# Patient Record
Sex: FEMALE | Race: WHITE | Employment: OTHER | URBAN - METROPOLITAN AREA
[De-identification: names, ages, dates, MRNs, and addresses within clinical notes are randomized per-mention and may not be internally consistent; named-entity substitution may affect disease eponyms.]

---

## 2020-11-16 ENCOUNTER — HOSPITAL ENCOUNTER (OUTPATIENT)
Dept: SURGERY | Age: 85
Discharge: HOME | End: 2020-11-16
Attending: ORTHOPAEDIC SURGERY
Payer: MEDICARE

## 2020-11-16 ENCOUNTER — HOSPITAL ENCOUNTER (OUTPATIENT)
Dept: PHYSICAL THERAPY | Age: 85
Discharge: HOME | End: 2020-11-16
Attending: ORTHOPAEDIC SURGERY
Payer: MEDICARE

## 2020-11-16 VITALS
OXYGEN SATURATION: 95 % | DIASTOLIC BLOOD PRESSURE: 75 MMHG | TEMPERATURE: 97.7 F | RESPIRATION RATE: 16 BRPM | HEART RATE: 63 BPM | BODY MASS INDEX: 26.96 KG/M2 | SYSTOLIC BLOOD PRESSURE: 149 MMHG | WEIGHT: 182 LBS | HEIGHT: 69 IN

## 2020-11-16 LAB
ANION GAP SERPL CALC-SCNC: 4 MMOL/L (ref 7–16)
APTT PPP: 26.9 SEC (ref 24.1–35.1)
ATRIAL RATE: 59 BPM
BACTERIA SPEC CULT: NORMAL
BASOPHILS # BLD: 0.1 K/UL (ref 0–0.2)
BASOPHILS NFR BLD: 1 % (ref 0–2)
BUN SERPL-MCNC: 29 MG/DL (ref 8–23)
CALCIUM SERPL-MCNC: 9.1 MG/DL (ref 8.3–10.4)
CALCULATED P AXIS, ECG09: 55 DEGREES
CALCULATED R AXIS, ECG10: 39 DEGREES
CALCULATED T AXIS, ECG11: 46 DEGREES
CHLORIDE SERPL-SCNC: 109 MMOL/L (ref 98–107)
CO2 SERPL-SCNC: 30 MMOL/L (ref 21–32)
CREAT SERPL-MCNC: 1.48 MG/DL (ref 0.6–1)
DIAGNOSIS, 93000: NORMAL
DIFFERENTIAL METHOD BLD: ABNORMAL
EOSINOPHIL # BLD: 0.4 K/UL (ref 0–0.8)
EOSINOPHIL NFR BLD: 4 % (ref 0.5–7.8)
ERYTHROCYTE [DISTWIDTH] IN BLOOD BY AUTOMATED COUNT: 12.9 % (ref 11.9–14.6)
EST. AVERAGE GLUCOSE BLD GHB EST-MCNC: 160 MG/DL
GLUCOSE SERPL-MCNC: 87 MG/DL (ref 65–100)
HBA1C MFR BLD: 7.2 %
HCT VFR BLD AUTO: 48.5 % (ref 35.8–46.3)
HGB BLD-MCNC: 15.6 G/DL (ref 11.7–15.4)
IMM GRANULOCYTES # BLD AUTO: 0.1 K/UL (ref 0–0.5)
IMM GRANULOCYTES NFR BLD AUTO: 1 % (ref 0–5)
INR PPP: 1
LYMPHOCYTES # BLD: 2.7 K/UL (ref 0.5–4.6)
LYMPHOCYTES NFR BLD: 26 % (ref 13–44)
MCH RBC QN AUTO: 32.5 PG (ref 26.1–32.9)
MCHC RBC AUTO-ENTMCNC: 32.2 G/DL (ref 31.4–35)
MCV RBC AUTO: 101 FL (ref 79.6–97.8)
MONOCYTES # BLD: 0.7 K/UL (ref 0.1–1.3)
MONOCYTES NFR BLD: 7 % (ref 4–12)
NEUTS SEG # BLD: 6.5 K/UL (ref 1.7–8.2)
NEUTS SEG NFR BLD: 63 % (ref 43–78)
NRBC # BLD: 0 K/UL (ref 0–0.2)
P-R INTERVAL, ECG05: 164 MS
PLATELET # BLD AUTO: 206 K/UL (ref 150–450)
PMV BLD AUTO: 10.9 FL (ref 9.4–12.3)
POTASSIUM SERPL-SCNC: 4.4 MMOL/L (ref 3.5–5.1)
PROTHROMBIN TIME: 13.7 SEC (ref 12.5–14.7)
Q-T INTERVAL, ECG07: 446 MS
QRS DURATION, ECG06: 128 MS
QTC CALCULATION (BEZET), ECG08: 441 MS
RBC # BLD AUTO: 4.8 M/UL (ref 4.05–5.2)
SERVICE CMNT-IMP: NORMAL
SODIUM SERPL-SCNC: 143 MMOL/L (ref 136–145)
VENTRICULAR RATE, ECG03: 59 BPM
WBC # BLD AUTO: 10.5 K/UL (ref 4.3–11.1)

## 2020-11-16 PROCEDURE — 85730 THROMBOPLASTIN TIME PARTIAL: CPT

## 2020-11-16 PROCEDURE — 80048 BASIC METABOLIC PNL TOTAL CA: CPT

## 2020-11-16 PROCEDURE — 85610 PROTHROMBIN TIME: CPT

## 2020-11-16 PROCEDURE — 85025 COMPLETE CBC W/AUTO DIFF WBC: CPT

## 2020-11-16 PROCEDURE — 87641 MR-STAPH DNA AMP PROBE: CPT

## 2020-11-16 PROCEDURE — 83036 HEMOGLOBIN GLYCOSYLATED A1C: CPT

## 2020-11-16 PROCEDURE — 36415 COLL VENOUS BLD VENIPUNCTURE: CPT

## 2020-11-16 PROCEDURE — 97161 PT EVAL LOW COMPLEX 20 MIN: CPT

## 2020-11-16 PROCEDURE — 77030027138 HC INCENT SPIROMETER -A

## 2020-11-16 RX ORDER — GLIPIZIDE 5 MG/1
5 TABLET, FILM COATED, EXTENDED RELEASE ORAL DAILY
COMMUNITY
Start: 2020-04-23

## 2020-11-16 RX ORDER — CHOLECALCIFEROL TAB 125 MCG (5000 UNIT) 125 MCG
1000 TAB ORAL DAILY
COMMUNITY
Start: 2018-12-26

## 2020-11-16 RX ORDER — BENAZEPRIL HYDROCHLORIDE 5 MG/1
5 TABLET ORAL DAILY
COMMUNITY
Start: 2020-01-14 | End: 2021-01-13

## 2020-11-16 RX ORDER — GABAPENTIN 100 MG/1
100 CAPSULE ORAL 3 TIMES DAILY
COMMUNITY
Start: 2020-02-25

## 2020-11-16 RX ORDER — ATORVASTATIN CALCIUM 10 MG/1
10 TABLET, FILM COATED ORAL DAILY
COMMUNITY
Start: 2020-04-19

## 2020-11-16 RX ORDER — CEFAZOLIN SODIUM/WATER 2 G/20 ML
2 SYRINGE (ML) INTRAVENOUS ONCE
Status: CANCELLED | OUTPATIENT
Start: 2020-11-16 | End: 2020-11-16

## 2020-11-16 RX ORDER — HYDROCHLOROTHIAZIDE 12.5 MG/1
12.5 CAPSULE ORAL DAILY
COMMUNITY
Start: 2020-08-03

## 2020-11-16 RX ORDER — UREA 10 %
LOTION (ML) TOPICAL DAILY
COMMUNITY

## 2020-11-16 RX ORDER — SOLIFENACIN SUCCINATE 5 MG/1
5 TABLET, FILM COATED ORAL DAILY
COMMUNITY
Start: 2019-12-23

## 2020-11-16 RX ORDER — ASPIRIN 81 MG/1
81 TABLET ORAL DAILY
COMMUNITY
End: 2020-12-09

## 2020-11-16 NOTE — PROGRESS NOTES
Joint Camp Case Management note:  Patient screened in Prehab for discharge planning needs. Patient scheduled for a future total joint replacement. We discussed Home Health and equipment needed after surgery. List of Home Health providers offered. Patient w/o preference towards provider. Patient lives in [facility]. Will send ref to [facility] at time of surgery. Will need a walker but has a RTS.

## 2020-11-16 NOTE — PERIOP NOTES
Patient verified name and . Order for consent  found in EHR and matches case posting; patient verified. Type 3 surgery, joint camp assessment complete. Labs per surgeon: CBC,BMP, PT/PTT, Hgb A1c ; results within anesthesia guidelines;; routed via fax to pcp, Dr. Janeth Garcia and to surgeon, Dr Jennyfer Barragan for review. Labs per anesthesia protocol: no additional  EKG:completed today per protocol~abnormal~placed on chart along with ECHO from 19 and EKG from 3/22/18. Pt does not see cardiologist.  No other cardiology records available. Chart placed in problem chart box for anesthesia review. Most recent nephrology note 20 Dr Kalina Romeo printed and placed on chart for anesthesia reference. Patient aware that a negative Covid swab result is required to proceed with surgery; appointments are made by the surgeon office and should test should be collected 7 days prior to surgery. The testing center is located at the . [address], [place]. MRSA/MSSA swab collected; pharmacy to review and dose antibiotic as appropriate. Hospital approved surgical skin cleanser and instructions to return bottle on DOS given per hospital policy. Patient provided with handouts including Guide to Surgery, Pain Management, Hand Hygiene, Blood Transfusion Education, and [facility] Brochure. Patient answered medical/surgical history questions at their best of ability. All prior to admission medications documented in [facility]. Original medication prescription bottle YES visualized during patient appointment. Patient instructed to hold all vitamins 3 weeks prior to surgery and NSAIDS 5 days prior to surgery. Patient teach back successful and patient demonstrates knowledge of instruction.

## 2020-11-16 NOTE — PROGRESS NOTES
11/16/20 1200   Oxygen Therapy   O2 Sat (%) 96 %   Pulse via Oximetry 82 beats per minute   O2 Device Room air     Initial respiratory Assessment completed with pt. Pt was interviewed and evaluated in Joint camp prior to surgery. Patient ID:  David Coronado  [id number]  27 y.o.  10/11/1930  Surgeon: Dr. Bonnielee Dubin  Date of Surgery: 12/7/2020  Procedure: Total Right Hip Arthroplasty  Primary Care Physician: Je Crespo -[phone]  Specialists:     Pt. IS SOMEWHAT PRACTICING SOCIAL DISTANCING AND WEARING A MASK WHEN IN PUBLIC. Pt taught proper COUGH technique  DIAPHRAGMATIC BREATHING EXERCISE INSTRUCTIONS GIVEN    History of smoking:   DENIES                 Quit date:         Secondhand smoke:DENIES    Past procedures with Oxygen desaturation or delayed awakening:DENIES    Past Medical History:   Diagnosis Date    Arthralgia     Chronic kidney disease     CKD (chronic kidney disease), stage III     GFR 30-59; followed by Dr Raul Uribe nephrologist; 11/2/20:  BUN 30; Creatinine 1.66    Essential hypertension     Hip pain     Hyperlipidemia     Mild aortic valve regurgitation     and sclerosis; [facility]CHO 2/25/19 in [facility]     ECHO 2/25/19 in [facility] Neuropathy     legs    Overactive bladder     followed by urology    Primary osteoarthritis of both knees     Right bundle branch block     Type 2 diabetes mellitus ([facility] [address]     stable~ Hgb A1C 11/2/20:  6.6; takes Glipizide daily        Respiratory history:DENIES SOB                                                                   Respiratory meds:  DENIES    FAMILY PRESENT:            YES- DAUGHTER                                                   PAST SLEEP STUDY:                           DENIES  HX OF PAVITHRA:                                            DENIES  PAVITHRA assessment:                                               SLEEPS ON SIDE        PHYSICAL EXAM   Body mass index is 26.88 kg/m².    Visit Vitals  BP (!) 149/75 (BP 1 Location: Left arm, BP Patient Position: Sitting)   Pulse 63   Temp 97.7 °F (36.5 °C)   Resp 16   Ht 5' 9\" (1.753 m)   Wt 82.6 kg (182 lb)   SpO2 95%   BMI 26.88 kg/m²     Neck circumference:   40   cm    Loud snoring:                                                            DENIES  Witnessed apnea or wakening gasping or choking:        DENIES        Awakens with headaches:                                               DENIES  Morning or daytime tiredness/ sleepiness:                     TIRED  Pt states age related.   Dry mouth or sore throat in morning:                              DENIES                                   Das stage: 4                                    SACS score:13  Stop Bang 3  STOP-BANG  Does the patient snore loudly (louder than talking or loud enough to be heard through closed doors)?: No  Does the patient often feel tired, fatigued, or sleepy during the daytime, even after a \"good\" night's sleep?: Yes  Has anyone ever observed the patient stop breathing during their sleep? : No  Does the patient have or are they being treated for high blood pressure?: Yes  Is the patient's BMI greater than 35?: No  Is your neck circumference greater than 17 inches (Male) or 16 inches (Female)?: No  Is the patient older than 48?: Yes  Is the patient male?: No  PAVITHRA Score: 3  Has the patient been referred to Sleep Medicine?: No  Has the patient previously been diagnosed with Obstructive Sleep Apnea?: No                               CS                                      Referrals:    Pt. Phone Number:

## 2020-11-16 NOTE — PERIOP NOTES
Recent Results (from the past 8 hour(s))   CBC WITH AUTOMATED DIFF    Collection Time: 11/16/20 12:15 PM   Result Value Ref Range    WBC 10.5 4.3 - 11.1 K/uL    RBC 4.80 4.05 - 5.2 M/uL    HGB 15.6 (H) 11.7 - 15.4 g/dL    HCT 48.5 (H) 35.8 - 46.3 %    .0 (H) 79.6 - 97.8 FL    MCH 32.5 26.1 - 32.9 PG    MCHC 32.2 31.4 - 35.0 g/dL    RDW 12.9 11.9 - 14.6 %    PLATELET 970 872 - 125 K/uL    MPV 10.9 9.4 - 12.3 FL    ABSOLUTE NRBC 0.00 0.0 - 0.2 K/uL    DF AUTOMATED      NEUTROPHILS 63 43 - 78 %    LYMPHOCYTES 26 13 - 44 %    MONOCYTES 7 4.0 - 12.0 %    EOSINOPHILS 4 0.5 - 7.8 %    BASOPHILS 1 0.0 - 2.0 %    IMMATURE GRANULOCYTES 1 0.0 - 5.0 %    ABS. NEUTROPHILS 6.5 1.7 - 8.2 K/UL    ABS. LYMPHOCYTES 2.7 0.5 - 4.6 K/UL    ABS. MONOCYTES 0.7 0.1 - 1.3 K/UL    ABS. EOSINOPHILS 0.4 0.0 - 0.8 K/UL    ABS. BASOPHILS 0.1 0.0 - 0.2 K/UL    ABS. IMM.  GRANS. 0.1 0.0 - 0.5 K/UL   PROTHROMBIN TIME + INR    Collection Time: 11/16/20 12:15 PM   Result Value Ref Range    Prothrombin time 13.7 12.5 - 14.7 sec    INR 1.0     PTT    Collection Time: 11/16/20 12:15 PM   Result Value Ref Range    aPTT 26.9 24.1 - 54.6 SEC   METABOLIC PANEL, BASIC    Collection Time: 11/16/20 12:15 PM   Result Value Ref Range    Sodium 143 136 - 145 mmol/L    Potassium 4.4 3.5 - 5.1 mmol/L    Chloride 109 (H) 98 - 107 mmol/L    CO2 30 21 - 32 mmol/L    Anion gap 4 (L) 7 - 16 mmol/L    Glucose 87 65 - 100 mg/dL    BUN 29 (H) 8 - 23 MG/DL    Creatinine 1.48 (H) 0.6 - 1.0 MG/DL    GFR est AA 43 (L) >60 ml/min/1.73m2    GFR est non-AA 35 (L) >60 ml/min/1.73m2    Calcium 9.1 8.3 - 10.4 MG/DL   HEMOGLOBIN A1C WITH EAG    Collection Time: 11/16/20 12:15 PM   Result Value Ref Range    Hemoglobin A1c 7.2 %    Est. average glucose 160 mg/dL

## 2020-11-16 NOTE — PERIOP NOTES
How to Use Your Incentive Spirometer     About Your Incentive Spirometer  An incentive spirometer is a device that will expand your lungs by helping you to breathe more deeply and fully. The parts of your incentive spirometer are labeled in Figure 1. Using your incentive spirometer  When youre using your incentive spirometer, make sure to breathe through your mouth. If you breathe through your nose, the incentive spirometer wont work properly. You can hold your nose if you have trouble. DO NOT BLOW INTO THE DEVICE. If you feel dizzy at any time, stop and rest. Try again at a later time. 1. Sit upright in a chair or in bed. Hold the incentive spirometer at eye level. 2. Put the mouthpiece in your mouth and close your lips tightly around it. Slowly breathe out (exhale) completely. 3. Breathe in (inhale) slowly through your mouth as deeply as you can. As you take the breath, you will see the piston rise inside the large column. While the piston rises, the indicator on the right should move upwards. It should stay in between the 2 arrows (see Figure 1). 4. Try to get the piston as high as you can, while keeping the indicator between the arrows. If the indicator doesnt stay between the arrows, youre breathing either too fast or too slow. 5. When you get it as high as you can, hold your breath for 10 seconds, or as long as possible. While youre holding your breath, the piston will slowly fall to the base of the spirometer. 6. Once the piston reaches the bottom of the spirometer, breathe out slowly through your mouth. Rest for a few seconds. 7. Repeat 10 times. Try to get the piston to the same level with each breath. 8. After each set of 10 breaths, try to cough as coughing will help loosen or clear any mucus in your lungs. 9. Put the marker at the level the piston reached on your incentive spirometer. This will be your goal next time. Repeat these steps every hour that youre awake.   Cover the mouthpiece of the incentive spirometer when you arent using it

## 2020-11-16 NOTE — PERIOP NOTES
PLEASE CONTINUE TAKING ALL PRESCRIPTION MEDICATIONS UP TO THE DAY OF SURGERY UNLESS OTHERWISE DIRECTED BELOW. DISCONTINUE all vitamins and supplements 21 days prior to surgery. DISCONTINUE Non-Steriodal Anti-Inflammatory (NSAIDS) such as Advil, Motrin, Ibuprofen, Aleve; Aspirin products 5 days prior to surgery. Home Medications to take  the day of surgery    Atorvastatin   Solifenacin        Home Medications   to Hold   Avoid Tylenol/Acetaminophen products for 12 hours prior to surgery arrival        Comments    Bring remaining Dynahex solution and Incentive Spirometer      *Visitor policy of 1 visitor per patient discussed. Please do not bring home medications with you on the day of surgery unless otherwise directed by your nurse. If you are instructed to bring home medications, please give them to your nurse as they will be administered by the nursing staff. If you have any questions, please call [address] [place] [address] [phone] or [facility] [phone]. A copy of this note was provided to the patient for reference.

## 2020-11-16 NOTE — PROGRESS NOTES
Anuel Siddiqui  : 10/11/1930(89 y.o.) Joint Camp at [address], [place] [address].  [phone]       Physical Therapy Prehab Plan of Treatment and Evaluation Summary:2020    ICD-10: Treatment Diagnosis:   · Pain in Right Hip (M25.551)  · Stiffness of Right Hip, Not elsewhere classified (M25.651)  Precautions/Allergies:   Fesoterodine  MEDICAL/REFERRING DIAGNOSIS:  Unilateral primary osteoarthritis, right hip [M16.11]  REFERRING PHYSICIAN: Janeth Altamirano MD  DATE OF SURGERY: 20    Assessment:   Comments:  Scheduled for R MARLEY. Reviewed hip precautions with patient and daughter. Patient lives alone and is independent with gait and ADLs. Still mows the lawn on riding mower. Daughter will stay with her after surgery. Patient reports she occasionally uses a cane outside of the home but is not using one today. PROBLEM LIST (Impacting functional limitations):  Ms. Jaimee Mast presents with the following right lower extremity(s) problems:  1. Gait  2. Strength  3. Range of Motion  4. Home Exercise Program  5. Precautions  6. Pain   INTERVENTIONS PLANNED:  1. Home Exercise Program  2. Educational Discussion      TREATMENT PLAN: Effective Dates: 2020 TO 2020. Frequency/Duration: Patient to continue to perform home exercise program at least twice per day up until her surgery. GOALS: (Goals have been discussed and agreed upon with patient.)  Discharge Goals: Time Frame: 1 Day  1. Patient will demonstrate independence with a home exercise program designed to increase functional technique and pain control to minimize functional deficits and optimize patient for total joint replacement. Rehabilitation Potential For Stated Goals: Good  Regarding Shanique Serrano therapy, I certify that the treatment plan above will be carried out by a therapist or under their direction.   Thank you for this referral,  Maxwell Collet, YOSELIN               HISTORY:   Present Symptoms:  Pain Intensity 1: 0(0 sitting; 6 with extended walking and standing)   History of Present Injury/Illness (Reason for Referral):  Medical/Referring Diagnosis: Unilateral primary osteoarthritis, right hip [M16.11]   Past Medical History/Comorbidities:   Ms. Monserrat Yanez  has a past medical history of Arthralgia, Chronic kidney disease, CKD (chronic kidney disease), stage III, Essential hypertension, Hip pain, Hyperlipidemia, Mild aortic valve regurgitation, Murmur, Neuropathy, Overactive bladder, Primary osteoarthritis of both knees, Right bundle branch block, and Type 2 diabetes mellitus ([facility] [address] She also has no past medical history of CAD (coronary artery disease). Ms. Monserrat Yanez  has a past surgical history that includes hx fracture tx; hx nephrectomy (Right, 03/21/2018); hx cataract removal (Bilateral); hx other surgical (01/29/2019); and hx back surgery (1992).   Social History/Living Environment:   Home Environment: Private residence  # Steps to Enter: 2  Rails to Enter: Yes  Hand Rails : Right  One/Two Story Residence: One story(with basement-13 steps)  Living Alone: Yes  Support Systems: Child(silvia)  Patient Expects to be Discharged to[de-identified] Private residence  Current DME Used/Available at Home: Cane, straight, Raised toilet seat, Shower chair  Tub or Shower Type: Shower  Work/Activity:  retired  Dominant Side:  RIGHT  Current Medications:  See Pre-assessment nursing note   Number of Personal Factors/Comorbidities that affect the Plan of Care: 0: LOW COMPLEXITY   EXAMINATION:   ADLs (Current Functional Status):   Ambulation:  [x] Independent  [] Walk Indoors Only  [] Walk Outdoors  [x] Use Assistive Device-occasional use of cane  [] Use Wheelchair Only Dressing:  [x] Independent  Requires Assistance from Someone for:  [] Sock/Shoes  [] Pants  [] Everything   Bathing/Showering:   [x] Independent  [] Requires Assistance from Someone  [] [address]:  [x] Routine house and yard work  [] Light Housework Only  [] None   Observation/Orthostatic Postural Assessment:       ROM/Flexibility:   AROM: Generally decreased, functional                       RLE AROM  R Hip Flexion: 110  R Hip ABduction: 25   Strength:   Strength: Generally decreased, functional              RLE Strength  R Hip Flexion: 2+   Functional Mobility:         Stand to Sit: Modified independent, Additional time  Sit to Stand: Modified independent, Additional time  Distance (ft): 250 Feet (ft)  Ambulation - Level of Assistance: Independent  Speed/Vilma: Slow  Stance: Right decreased  Gait Abnormalities: Antalgic          Balance:    Sitting: Intact  Standing: Intact   Body Structures Involved:  1. Bones  2. Joints  3. Muscles Body Functions Affected:  1. Neuromusculoskeletal  2. Movement Related Activities and Participation Affected:  1. General Tasks and Demands  2. Mobility   Number of elements that affect the Plan of Care: 3: MODERATE COMPLEXITY   CLINICAL PRESENTATION:   Presentation: Stable and uncomplicated: LOW COMPLEXITY   CLINICAL DECISION MAKING:   Outcome Measure: Tool Used: Lower Extremity Functional Scale (LEFS)  Score:  Initial: 28/80 Most Recent: X/80 (Date: -- )   Interpretation of Score: 20 questions each scored on a 5 point scale with 0 representing \"extreme difficulty or unable to perform\" and 4 representing \"no difficulty\". The lower the score, the greater the functional disability. 80/80 represents no disability. Minimal detectable change is 9 points. Medical Necessity:   · Ms. Dalton Espino is expected to optimize her lower extremity strength and ROM in preparation for joint replacement surgery. Reason for Services/Other Comments:  · Achieve baseline assesment of musculoskeletal system, functional mobility and home environment. , educate in PT HEP in preparation for surgery, educate in hospital plan of care.    Use of outcome tool(s) and clinical judgement create a POC that gives a: Clear prediction of patient's progress: LOW COMPLEXITY   TREATMENT:   Treatment/Session Assessment:  Patient was instructed in PT- HEP to increase strength and ROM in LEs. Answered all questions. · Post session pain:  0  · Compliance with Program/Exercises: anticipate compliance.   Total Treatment Duration:  PT Patient Time In/Time Out  Time In: 1300  Time Out: 12 [facility]

## 2020-11-17 NOTE — ADVANCED PRACTICE NURSE
Total Joint Surgery Preoperative Chart Review      Patient ID:  Sly Vinson  [id number]  60 y.o.  10/11/1930  Surgeon: Dr. Cecilia Rincon  Date of Surgery: 12/7/2020  Procedure: Total Right Knee Arthroplasty  Primary Care Physician: Sai Reynolds -[phone]  Specialty Physician(s):      Subjective:   Sly Vinson is a 80 y.o. WHITE OR  female who presents for preoperative evaluation for Total Right Knee arthroplasty. This is a preoperative chart review note based on data collected by the nurse at the surgical Pre-Assessment visit. Past Medical History:   Diagnosis Date    Arthralgia     Chronic kidney disease     CKD (chronic kidney disease), stage III     GFR 30-59; followed by Dr Nia Wolfe nephrologist; 11/2/20:  BUN 30; Creatinine 1.66    Essential hypertension     Hip pain     Hyperlipidemia     Mild aortic valve regurgitation     and sclerosis; [facility]/19 [facility]     ECHO 2/25/[address] in [address] Neuropathy     legs    Overactive bladder     followed by urology    Primary osteoarthritis of both knees     Right bundle branch block     Type 2 diabetes mellitus ([facility] [address]     stable~ Hgb A1C 11/2/20:  6.6; takes Glipizide daily      Past Surgical History:   Procedure Laterality Date    HX BACK SURGERY  1992    removal of deteriorated disc with fusion L4-5; Multilevel lumbar spondylosis, with marginal endplate osteophytes some loss of disc height at all levels. Bilateral facet hypertrophy in the lower lumbar spine.     HX CATARACT REMOVAL Bilateral     HX FRACTURE TX      HX NEPHRECTOMY Right 03/21/2018    radical path:  Oncocytoma    HX OTHER SURGICAL  01/29/2019    cystoscopy     Family History   Problem Relation Age of Onset    Heart Disease Mother     Lung Disease Father       Social History     Tobacco Use    Smoking status: Never Smoker    Smokeless tobacco: Never Used   Substance Use Topics    Alcohol use: Never     Frequency: Never       Prior to Admission medications    Medication Sig Start Date End Date Taking? Authorizing Provider   aspirin delayed-release 81 mg tablet Take 81 mg by mouth daily. Indications: treatment to prevent a heart attack   Yes Provider, Historical   cyanocobalamin (Vitamin B-12) 100 mcg tablet Take  by mouth daily. Indications: prevention of vitamin B12 deficiency   Yes Provider, Historical   atorvastatin (LIPITOR) 10 mg tablet Take 10 mg by mouth daily. Takes at noon; Take / use AM day of surgery  per anesthesia protocols. Indications: excessive fat in the blood 4/19/20  Yes Provider, Historical   benazepriL (LOTENSIN) 5 mg tablet Take 5 mg by mouth daily. Indications: high blood pressure 1/14/20 1/13/21 Yes Provider, Historical   cholecalciferol (VITAMIN D3) (5000 Units/125 mcg) tab tablet Take 1,000 Units by mouth daily. Indications: prevention of vitamin D deficiency 12/26/18  Yes Provider, Historical   gabapentin (NEURONTIN) 100 mg capsule Take 100 mg by mouth three (3) times daily. Takes 1 at 1800; 1 at 2000; 2 at 2200 PM  Indications: neuropathic pain 2/25/20  Yes Provider, Historical   glipiZIDE SR (GLUCOTROL XL) 5 mg CR tablet Take 5 mg by mouth daily. Indications: type 2 diabetes mellitus 4/23/20  Yes Provider, Historical   hydroCHLOROthiazide (MICROZIDE) 12.5 mg capsule Take 12.5 mg by mouth daily. Indications: high blood pressure 8/3/20  Yes Provider, Historical   solifenacin (VESICARE) 5 mg tablet Take 5 mg by mouth daily. Take / use AM day of surgery  per anesthesia protocols.   Indications: urinary urgency, or the sudden urge to urinate 12/23/19  Yes Provider, Historical     Allergies   Allergen Reactions    Fesoterodine Other (comments)     Caused stomach issues          Objective:     Physical Exam:   Patient Vitals for the past 24 hrs:   Temp Pulse Resp BP SpO2   11/16/20 1333 97.7 °F (36.5 °C) 63 16 (!) 149/75 95 %       ECG:    EKG Results     Procedure 720 Value Units Date/Time    EKG, 12 LEAD, INITIAL [[id number]] Collected:  11/16/20 1340    Order Status:  Completed Updated:  11/16/20 1452     Ventricular Rate 59 BPM      Atrial Rate 59 BPM      P-R Interval 164 ms      QRS Duration 128 ms      Q-T Interval 446 ms      QTC Calculation (Bezet) 441 ms      Calculated P Axis 55 degrees      Calculated R Axis 39 degrees      Calculated T Axis 46 degrees      Diagnosis --     Sinus bradycardia  Non-specific intra-ventricular conduction block  Left atrial enlargement  T wave abnormality, consider anterior ischemia  Abnormal ECG  No previous ECGs available  Confirmed by Meryl Lerner MD (), Dyan Lopez ([id number]) on 11/16/2020 2:51:54 PM            Data Review:   Labs:   Recent Results (from the past 24 hour(s))   MSSA/MRSA SC BY PCR, NASAL SWAB    Collection Time: 11/16/20  1:27 PM    Specimen: Nasal swab   Result Value Ref Range    Special Requests: NO SPECIAL REQUESTS      Culture result:        SA target not detected. A MRSA NEGATIVE, SA NEGATIVE test result does not preclude MRSA or SA nasal colonization. EKG, 12 LEAD, INITIAL    Collection Time: 11/16/20  1:40 PM   Result Value Ref Range    Ventricular Rate 59 BPM    Atrial Rate 59 BPM    P-R Interval 164 ms    QRS Duration 128 ms    Q-T Interval 446 ms    QTC Calculation (Bezet) 441 ms    Calculated P Axis 55 degrees    Calculated R Axis 39 degrees    Calculated T Axis 46 degrees    Diagnosis       Sinus bradycardia  Non-specific intra-ventricular conduction block  Left atrial enlargement  T wave abnormality, consider anterior ischemia  Abnormal ECG  No previous ECGs available  Confirmed by Meryl Lerner MD ()Dyan ([id number]) on 11/16/2020 2:51:54 PM       Labs reviewed    Total Joint Surgery Pre-Assessment Recommendations:           Recommend continuous saturation monitoring hours of sleep, during hospitalization. O2 prn per respiratory protocol.        Signed By: CASIMIRO Boyce    November 17, 2020

## 2020-12-01 NOTE — H&P
[facility]  Pre Operative History and Physical Exam    Patient ID:  Tony Villanueva  [id number]  79 y.o.  10/11/1930    Today: December 1, 2020           CC:  Right hip pain    HPI:   The patient has end stage arthritis of the right hip. The patient was evaluated and examined during a consultation prior to this office visit. There have been no changes to the patient's orthopedic condition since the initial consultation. The patient has failed previous conservative treatment for this condition including antiinflammatories , and lifestyle modifications. The necessity for joint replacement is present. The patient will be admitted the day of surgery for Procedure(s) (LRB):  RIGHT HIP ARTHROPLASTY TOTAL/ DEPUY (Right)      Past Medical/Surgical History:  Past Medical History:   Diagnosis Date    Arthralgia     Chronic kidney disease     CKD (chronic kidney disease), stage III     GFR 30-59; followed by Dr Mary Bonds nephrologist; 11/2/20:  BUN 30; Creatinine 1.66    Essential hypertension     Hip pain     Hyperlipidemia     Mild aortic valve regurgitation     and sclerosis; ECHO 2/25/19 in [facility]     ECHO 2/25/19 in [address] Neuropathy     legs    Overactive bladder     followed by urology    Primary osteoarthritis of both knees     Right bundle branch block     Type 2 diabetes mellitus ([facility] [address]     stable~ Hgb A1C 11/2/20:  6.6; takes Glipizide daily     Past Surgical History:   Procedure Laterality Date    HX BACK SURGERY  1992    removal of deteriorated disc with fusion L4-5; Multilevel lumbar spondylosis, with marginal endplate osteophytes some loss of disc height at all levels. Bilateral facet hypertrophy in the lower lumbar spine.  HX CATARACT REMOVAL Bilateral     HX FRACTURE TX      HX NEPHRECTOMY Right 03/21/2018    radical path:  Oncocytoma    HX OTHER SURGICAL  01/29/2019    cystoscopy        Allergies:    Allergies   Allergen Reactions    Fesoterodine Other (comments)     Caused stomach issues        Physical Exam:   General: NAD, Alert, Oriented, Appears their stated age     [de-identified]: NC/AT, PERRL    Skin: No rashes, lesions or wounds seen      Psych: normal affect      Heart: Regular Rate, Rhythm     Lungs: unlabored respirations, normal breath sounds     Abdomen: Soft and non-distended     Ortho: Pain with limited ROM of the right hip    Neuro: no focal defects, sensation is equal bilaterally     Lymph: no lymphadenopathy     Meds:   No current facility-administered medications for this encounter. Current Outpatient Medications   Medication Sig    aspirin delayed-release 81 mg tablet Take 81 mg by mouth daily. Indications: treatment to prevent a heart attack    cyanocobalamin (Vitamin B-12) 100 mcg tablet Take  by mouth daily. Indications: prevention of vitamin B12 deficiency    atorvastatin (LIPITOR) 10 mg tablet Take 10 mg by mouth daily. Takes at noon; Take / use AM day of surgery  per anesthesia protocols. Indications: excessive fat in the blood    benazepriL (LOTENSIN) 5 mg tablet Take 5 mg by mouth daily. Indications: high blood pressure    cholecalciferol (VITAMIN D3) (5000 Units/125 mcg) tab tablet Take 1,000 Units by mouth daily. Indications: prevention of vitamin D deficiency    gabapentin (NEURONTIN) 100 mg capsule Take 100 mg by mouth three (3) times daily. Takes 1 at 1800; 1 at 2000; 2 at 2200 PM  Indications: neuropathic pain    glipiZIDE SR (GLUCOTROL XL) 5 mg CR tablet Take 5 mg by mouth daily. Indications: type 2 diabetes mellitus    hydroCHLOROthiazide (MICROZIDE) 12.5 mg capsule Take 12.5 mg by mouth daily. Indications: high blood pressure    solifenacin (VESICARE) 5 mg tablet Take 5 mg by mouth daily. Take / use AM day of surgery  per anesthesia protocols.   Indications: urinary urgency, or the sudden urge to urinate         Labs:  Hospital Outpatient Visit on 11/16/2020   Component Date Value Ref Range Status    WBC 11/16/2020 10.5  4.3 - 11.1 K/uL Final    RBC 11/16/2020 4.80  4.05 - 5.2 M/uL Final    HGB 11/16/2020 15.6* 11.7 - 15.4 g/dL Final    HCT 11/16/2020 48.5* 35.8 - 46.3 % Final    MCV 11/16/2020 101.0* 79.6 - 97.8 FL Final    MCH 11/16/2020 32.5  26.1 - 32.9 PG Final    MCHC 11/16/2020 32.2  31.4 - 35.0 g/dL Final    RDW 11/16/2020 12.9  11.9 - 14.6 % Final    PLATELET 16/62/6225 335  150 - 450 K/uL Final    MPV 11/16/2020 10.9  9.4 - 12.3 FL Final    ABSOLUTE NRBC 11/16/2020 0.00  0.0 - 0.2 K/uL Final    **Note: Absolute NRBC parameter is now reported with Hemogram**    DF 11/16/2020 AUTOMATED    Final    NEUTROPHILS 11/16/2020 63  43 - 78 % Final    LYMPHOCYTES 11/16/2020 26  13 - 44 % Final    MONOCYTES 11/16/2020 7  4.0 - 12.0 % Final    EOSINOPHILS 11/16/2020 4  0.5 - 7.8 % Final    BASOPHILS 11/16/2020 1  0.0 - 2.0 % Final    IMMATURE GRANULOCYTES 11/16/2020 1  0.0 - 5.0 % Final    ABS. NEUTROPHILS 11/16/2020 6.5  1.7 - 8.2 K/UL Final    ABS. LYMPHOCYTES 11/16/2020 2.7  0.5 - 4.6 K/UL Final    ABS. MONOCYTES 11/16/2020 0.7  0.1 - 1.3 K/UL Final    ABS. EOSINOPHILS 11/16/2020 0.4  0.0 - 0.8 K/UL Final    ABS. BASOPHILS 11/16/2020 0.1  0.0 - 0.2 K/UL Final    ABS. IMM. GRANS.  11/16/2020 0.1  0.0 - 0.5 K/UL Final    Prothrombin time 11/16/2020 13.7  12.5 - 14.7 sec Final    INR 11/16/2020 1.0    Final    Comment: Suggested therapeutic INR range:  Venous thrombosis and embolus  2.0-3.0  Prosthetic heart valve         2.5-3.5  ** Note new reference range and method **      aPTT 11/16/2020 26.9  24.1 - 35.1 SEC Final    Comment: Heparin Therapeutic Range = 74 - 123 seconds  In addition to factor deficiency, monitoring heparin therapy, etc., evaluation of a prolonged aPTT result should include consideration of preanalytic variables such as heparin flush contamination, specimen integrity issues, etc.      Sodium 11/16/2020 143  136 - 145 mmol/L Final    Potassium 11/16/2020 4.4  3.5 - 5.1 mmol/L Final    Chloride 11/16/2020 109* 98 - 107 mmol/L Final    CO2 11/16/2020 30  21 - 32 mmol/L Final    Anion gap 11/16/2020 4* 7 - 16 mmol/L Final    Glucose 11/16/2020 87  65 - 100 mg/dL Final    Comment: 47 - 60 mg/dl Consistent with, but not fully diagnostic of hypoglycemia. 101 - 125 mg/dl Impaired fasting glucose/consistent with pre-diabetes mellitus  > 126 mg/dl Fasting glucose consistent with overt diabetes mellitus      BUN 11/16/2020 29* 8 - 23 MG/DL Final    Creatinine 11/16/2020 1.48* 0.6 - 1.0 MG/DL Final    GFR est AA 11/16/2020 43* >60 ml/min/1.73m2 Final    GFR est non-AA 11/16/2020 35* >60 ml/min/1.73m2 Final    Comment: (NOTE)  Estimated GFR is calculated using the Modification of Diet in Renal   Disease (MDRD) Study equation, reported for both  Americans   (GFRAA) and non- Americans (GFRNA), and normalized to 1.73m2   body surface area. The physician must decide which value applies to   the patient. The MDRD study equation should only be used in   individuals age 25 or older. It has not been validated for the   following: pregnant women, patients with serious comorbid conditions,   or on certain medications, or persons with extremes of body size,   muscle mass, or nutritional status.  Calcium 11/16/2020 9.1  8.3 - 10.4 MG/DL Final    Hemoglobin A1c 11/16/2020 7.2  % Final    Est. average glucose 11/16/2020 160  mg/dL Final    Comment: (NOTE)  The eAG should be interpreted with patient characteristics in mind   since ethnicity, interindividual differences, red cell lifespan,   variation in rates of glycation, etc. may affect the validity of the   calculation.  Special Requests: 11/16/2020 NO SPECIAL REQUESTS    Final    Culture result: 11/16/2020 SA target not detected. A MRSA NEGATIVE, SA NEGATIVE test result does not preclude MRSA or SA nasal colonization.     Final    Ventricular Rate 11/16/2020 59  BPM Final    Atrial Rate 11/16/2020 59  BPM Final    P-R Interval 11/16/2020 164  ms Final    QRS Duration 11/16/2020 128  ms Final    Q-T Interval 11/16/2020 446  ms Final    QTC Calculation (Bezet) 11/16/2020 441  ms Final    Calculated P Axis 11/16/2020 55  degrees Final    Calculated R Axis 11/16/2020 39  degrees Final    Calculated T Axis 11/16/2020 46  degrees Final    Diagnosis 11/16/2020    Final                    Value:Sinus bradycardia  Non-specific intra-ventricular conduction block  Left atrial enlargement  T wave abnormality, consider anterior ischemia  Abnormal ECG  No previous ECGs available  Confirmed by Glynn Lizama MD (), Sharifa Mendiola ([id number]) on 11/16/2020 2:51:54 PM                   There is no problem list on file for this patient. Assessment:   1. Arthritis of the right hip      Plan:    1. Proceed with scheduled Procedure(s) (LRB):  RIGHT HIP ARTHROPLASTY TOTAL/ DEPUY (Right)      The patient was counseled at length about the risks of ana Covid-19 during their perioperative period and any recovery window from their procedure. The patient was made aware that ana Covid-19  may worsen their prognosis for recovering from their procedure and lend to a higher morbidity and/or mortality risk. All material risks, benefits, and reasonable alternatives including postponing the procedure were discussed. The patient does  wish to proceed with the procedure at this time.          Signed By: JESSA Hernandez  December 1, 2020

## 2020-12-06 ENCOUNTER — ANESTHESIA EVENT (OUTPATIENT)
Dept: SURGERY | Age: 85
DRG: 470 | End: 2020-12-06
Payer: MEDICARE

## 2020-12-07 ENCOUNTER — HOSPITAL ENCOUNTER (INPATIENT)
Age: 85
LOS: 2 days | Discharge: HOME HEALTH | DRG: 470 | End: 2020-12-09
Attending: ORTHOPAEDIC SURGERY | Admitting: ORTHOPAEDIC SURGERY
Payer: MEDICARE

## 2020-12-07 ENCOUNTER — APPOINTMENT (OUTPATIENT)
Dept: GENERAL RADIOLOGY | Age: 85
DRG: 470 | End: 2020-12-07
Attending: PHYSICIAN ASSISTANT
Payer: MEDICARE

## 2020-12-07 ENCOUNTER — ANESTHESIA (OUTPATIENT)
Dept: SURGERY | Age: 85
DRG: 470 | End: 2020-12-07
Payer: MEDICARE

## 2020-12-07 DIAGNOSIS — Z96.641 STATUS POST RIGHT HIP REPLACEMENT: Primary | ICD-10-CM

## 2020-12-07 PROBLEM — M16.11 OSTEOARTHRITIS OF RIGHT HIP: Status: ACTIVE | Noted: 2020-12-07

## 2020-12-07 PROBLEM — E11.9 DIABETES MELLITUS TYPE 2, CONTROLLED (HCC): Status: ACTIVE | Noted: 2020-12-07

## 2020-12-07 PROBLEM — I10 HYPERTENSION: Status: ACTIVE | Noted: 2020-12-07

## 2020-12-07 PROBLEM — E78.5 HYPERLIPIDEMIA: Status: ACTIVE | Noted: 2020-12-07

## 2020-12-07 LAB
GLUCOSE BLD STRIP.AUTO-MCNC: 100 MG/DL (ref 65–100)
GLUCOSE BLD STRIP.AUTO-MCNC: 115 MG/DL (ref 65–100)
GLUCOSE BLD STRIP.AUTO-MCNC: 248 MG/DL (ref 65–100)
HGB BLD-MCNC: 13.9 G/DL (ref 11.7–15.4)
POTASSIUM SERPL-SCNC: 5.1 MMOL/L (ref 3.5–5.1)

## 2020-12-07 PROCEDURE — 76010000171 HC OR TIME 2 TO 2.5 HR INTENSV-TIER 1: Performed by: ORTHOPAEDIC SURGERY

## 2020-12-07 PROCEDURE — 77030019557 HC ELECTRD VES SEAL MEDT -F: Performed by: ORTHOPAEDIC SURGERY

## 2020-12-07 PROCEDURE — C1713 ANCHOR/SCREW BN/BN,TIS/BN: HCPCS | Performed by: ORTHOPAEDIC SURGERY

## 2020-12-07 PROCEDURE — C1776 JOINT DEVICE (IMPLANTABLE): HCPCS | Performed by: ORTHOPAEDIC SURGERY

## 2020-12-07 PROCEDURE — 77030008459 HC STPLR SKN COOP -B: Performed by: ORTHOPAEDIC SURGERY

## 2020-12-07 PROCEDURE — 72170 X-RAY EXAM OF PELVIS: CPT

## 2020-12-07 PROCEDURE — 84132 ASSAY OF SERUM POTASSIUM: CPT

## 2020-12-07 PROCEDURE — 65270000029 HC RM PRIVATE

## 2020-12-07 PROCEDURE — 77030014071 HC TIP SUC IRR BRSH STRY -B: Performed by: ORTHOPAEDIC SURGERY

## 2020-12-07 PROCEDURE — 76060000035 HC ANESTHESIA 2 TO 2.5 HR: Performed by: ORTHOPAEDIC SURGERY

## 2020-12-07 PROCEDURE — 74011636637 HC RX REV CODE- 636/637: Performed by: INTERNAL MEDICINE

## 2020-12-07 PROCEDURE — 77030013819 HC MX SYS CEM ZIMM -B: Performed by: ORTHOPAEDIC SURGERY

## 2020-12-07 PROCEDURE — 74011000250 HC RX REV CODE- 250: Performed by: ANESTHESIOLOGY

## 2020-12-07 PROCEDURE — 76210000006 HC OR PH I REC 0.5 TO 1 HR: Performed by: ORTHOPAEDIC SURGERY

## 2020-12-07 PROCEDURE — 74011250636 HC RX REV CODE- 250/636: Performed by: ORTHOPAEDIC SURGERY

## 2020-12-07 PROCEDURE — 74011250636 HC RX REV CODE- 250/636: Performed by: PHYSICIAN ASSISTANT

## 2020-12-07 PROCEDURE — 85018 HEMOGLOBIN: CPT

## 2020-12-07 PROCEDURE — 2709999900 HC NON-CHARGEABLE SUPPLY: Performed by: ORTHOPAEDIC SURGERY

## 2020-12-07 PROCEDURE — 74011250637 HC RX REV CODE- 250/637: Performed by: PHYSICIAN ASSISTANT

## 2020-12-07 PROCEDURE — 36415 COLL VENOUS BLD VENIPUNCTURE: CPT

## 2020-12-07 PROCEDURE — 77030031139 HC SUT VCRL2 J&J -A: Performed by: ORTHOPAEDIC SURGERY

## 2020-12-07 PROCEDURE — 77030007880 HC KT SPN EPDRL BBMI -B: Performed by: ANESTHESIOLOGY

## 2020-12-07 PROCEDURE — 77030008462 HC STPLR SKN PROX J&J -A: Performed by: ORTHOPAEDIC SURGERY

## 2020-12-07 PROCEDURE — 77030006835 HC BLD SAW SAG STRY -B: Performed by: ORTHOPAEDIC SURGERY

## 2020-12-07 PROCEDURE — 74011250636 HC RX REV CODE- 250/636: Performed by: ANESTHESIOLOGY

## 2020-12-07 PROCEDURE — 74011000250 HC RX REV CODE- 250: Performed by: PHYSICIAN ASSISTANT

## 2020-12-07 PROCEDURE — 77030003665 HC NDL SPN BBMI -A: Performed by: ANESTHESIOLOGY

## 2020-12-07 PROCEDURE — 97161 PT EVAL LOW COMPLEX 20 MIN: CPT

## 2020-12-07 PROCEDURE — 82962 GLUCOSE BLOOD TEST: CPT

## 2020-12-07 PROCEDURE — 97165 OT EVAL LOW COMPLEX 30 MIN: CPT

## 2020-12-07 PROCEDURE — 77030040922 HC BLNKT HYPOTHRM STRY -A: Performed by: ANESTHESIOLOGY

## 2020-12-07 PROCEDURE — 77030040361 HC SLV COMPR DVT MDII -B

## 2020-12-07 PROCEDURE — 74011000258 HC RX REV CODE- 258: Performed by: ORTHOPAEDIC SURGERY

## 2020-12-07 PROCEDURE — 97535 SELF CARE MNGMENT TRAINING: CPT

## 2020-12-07 PROCEDURE — 74011000250 HC RX REV CODE- 250

## 2020-12-07 PROCEDURE — 77030012935 HC DRSG AQUACEL BMS -B: Performed by: ORTHOPAEDIC SURGERY

## 2020-12-07 PROCEDURE — 94762 N-INVAS EAR/PLS OXIMTRY CONT: CPT

## 2020-12-07 PROCEDURE — 77030018673: Performed by: ORTHOPAEDIC SURGERY

## 2020-12-07 PROCEDURE — 77010033678 HC OXYGEN DAILY

## 2020-12-07 PROCEDURE — 0SR9049 REPLACEMENT OF RIGHT HIP JOINT WITH CERAMIC ON POLYETHYLENE SYNTHETIC SUBSTITUTE, CEMENTED, OPEN APPROACH: ICD-10-PCS | Performed by: ORTHOPAEDIC SURGERY

## 2020-12-07 PROCEDURE — 97110 THERAPEUTIC EXERCISES: CPT

## 2020-12-07 PROCEDURE — 94760 N-INVAS EAR/PLS OXIMETRY 1: CPT

## 2020-12-07 PROCEDURE — 74011250636 HC RX REV CODE- 250/636

## 2020-12-07 PROCEDURE — 2709999900 HC NON-CHARGEABLE SUPPLY

## 2020-12-07 PROCEDURE — 74011000250 HC RX REV CODE- 250: Performed by: ORTHOPAEDIC SURGERY

## 2020-12-07 DEVICE — SCR HEX 6.5X30MM -- TRIDENT II: Type: IMPLANTABLE DEVICE | Site: HIP | Status: FUNCTIONAL

## 2020-12-07 DEVICE — TRIDENT II TRITANIUM CLUSTER 54E
Type: IMPLANTABLE DEVICE | Site: HIP | Status: FUNCTIONAL
Brand: TRIDENT II

## 2020-12-07 DEVICE — INSERT ACET CUP X3 28X48MM -- RESTORATION ADM: Type: IMPLANTABLE DEVICE | Site: HIP | Status: FUNCTIONAL

## 2020-12-07 DEVICE — SIZED CEMENT PLUG
Type: IMPLANTABLE DEVICE | Site: HIP | Status: FUNCTIONAL
Brand: BONE PLUG

## 2020-12-07 DEVICE — IMPLANTABLE DEVICE: Type: IMPLANTABLE DEVICE | Site: HIP | Status: FUNCTIONAL

## 2020-12-07 DEVICE — V40 STEM
Type: IMPLANTABLE DEVICE | Site: HIP | Status: FUNCTIONAL
Brand: EXETER

## 2020-12-07 DEVICE — HIP H3 TOT ADV DUAL MOB -- IMPL CAPPED H3: Type: IMPLANTABLE DEVICE | Status: FUNCTIONAL

## 2020-12-07 DEVICE — HEAD FEM DELT V40 0MM NK 28MM -- V40 BIOLOX: Type: IMPLANTABLE DEVICE | Site: HIP | Status: FUNCTIONAL

## 2020-12-07 DEVICE — LINER- CEMENTLESS
Type: IMPLANTABLE DEVICE | Site: HIP | Status: FUNCTIONAL
Brand: MDM

## 2020-12-07 DEVICE — CEMENT BNE 20ML 40GM FULL DOSE PMMA W/O ANTIBIO M VISC: Type: IMPLANTABLE DEVICE | Site: HIP | Status: FUNCTIONAL

## 2020-12-07 RX ORDER — AMOXICILLIN 250 MG
2 CAPSULE ORAL DAILY
Status: DISCONTINUED | OUTPATIENT
Start: 2020-12-08 | End: 2020-12-09 | Stop reason: HOSPADM

## 2020-12-07 RX ORDER — GLIPIZIDE 5 MG/1
5 TABLET, FILM COATED, EXTENDED RELEASE ORAL DAILY
Status: DISCONTINUED | OUTPATIENT
Start: 2020-12-08 | End: 2020-12-09 | Stop reason: HOSPADM

## 2020-12-07 RX ORDER — PROPOFOL 10 MG/ML
INJECTION, EMULSION INTRAVENOUS
Status: DISCONTINUED | OUTPATIENT
Start: 2020-12-07 | End: 2020-12-07 | Stop reason: HOSPADM

## 2020-12-07 RX ORDER — MIDAZOLAM HYDROCHLORIDE 1 MG/ML
2 INJECTION, SOLUTION INTRAMUSCULAR; INTRAVENOUS
Status: DISCONTINUED | OUTPATIENT
Start: 2020-12-07 | End: 2020-12-07 | Stop reason: HOSPADM

## 2020-12-07 RX ORDER — DEXAMETHASONE SODIUM PHOSPHATE 4 MG/ML
INJECTION, SOLUTION INTRA-ARTICULAR; INTRALESIONAL; INTRAMUSCULAR; INTRAVENOUS; SOFT TISSUE AS NEEDED
Status: DISCONTINUED | OUTPATIENT
Start: 2020-12-07 | End: 2020-12-07 | Stop reason: HOSPADM

## 2020-12-07 RX ORDER — MIDAZOLAM HYDROCHLORIDE 1 MG/ML
4 INJECTION, SOLUTION INTRAMUSCULAR; INTRAVENOUS ONCE
Status: DISCONTINUED | OUTPATIENT
Start: 2020-12-07 | End: 2020-12-07 | Stop reason: HOSPADM

## 2020-12-07 RX ORDER — SODIUM CHLORIDE, SODIUM LACTATE, POTASSIUM CHLORIDE, CALCIUM CHLORIDE 600; 310; 30; 20 MG/100ML; MG/100ML; MG/100ML; MG/100ML
75 INJECTION, SOLUTION INTRAVENOUS CONTINUOUS
Status: DISCONTINUED | OUTPATIENT
Start: 2020-12-07 | End: 2020-12-07 | Stop reason: HOSPADM

## 2020-12-07 RX ORDER — FENTANYL CITRATE 50 UG/ML
INJECTION, SOLUTION INTRAMUSCULAR; INTRAVENOUS AS NEEDED
Status: DISCONTINUED | OUTPATIENT
Start: 2020-12-07 | End: 2020-12-07 | Stop reason: HOSPADM

## 2020-12-07 RX ORDER — GABAPENTIN 100 MG/1
100 CAPSULE ORAL 4 TIMES DAILY
Status: DISCONTINUED | OUTPATIENT
Start: 2020-12-07 | End: 2020-12-09 | Stop reason: HOSPADM

## 2020-12-07 RX ORDER — ACETAMINOPHEN 650 MG/1
650 SUPPOSITORY RECTAL ONCE
Status: DISCONTINUED | OUTPATIENT
Start: 2020-12-07 | End: 2020-12-07 | Stop reason: ALTCHOICE

## 2020-12-07 RX ORDER — LISINOPRIL 5 MG/1
5 TABLET ORAL DAILY
Status: DISCONTINUED | OUTPATIENT
Start: 2020-12-08 | End: 2020-12-09 | Stop reason: HOSPADM

## 2020-12-07 RX ORDER — HYDROCHLOROTHIAZIDE 12.5 MG/1
12.5 CAPSULE ORAL DAILY
Status: DISCONTINUED | OUTPATIENT
Start: 2020-12-08 | End: 2020-12-09 | Stop reason: HOSPADM

## 2020-12-07 RX ORDER — SODIUM CHLORIDE 0.9 % (FLUSH) 0.9 %
5-40 SYRINGE (ML) INJECTION EVERY 8 HOURS
Status: DISCONTINUED | OUTPATIENT
Start: 2020-12-07 | End: 2020-12-09 | Stop reason: HOSPADM

## 2020-12-07 RX ORDER — ROPIVACAINE HYDROCHLORIDE 2 MG/ML
INJECTION, SOLUTION EPIDURAL; INFILTRATION; PERINEURAL AS NEEDED
Status: DISCONTINUED | OUTPATIENT
Start: 2020-12-07 | End: 2020-12-07 | Stop reason: HOSPADM

## 2020-12-07 RX ORDER — BUPIVACAINE HYDROCHLORIDE 7.5 MG/ML
INJECTION, SOLUTION INTRASPINAL
Status: COMPLETED | OUTPATIENT
Start: 2020-12-07 | End: 2020-12-07

## 2020-12-07 RX ORDER — ACETAMINOPHEN 500 MG
1000 TABLET ORAL EVERY 6 HOURS
Status: DISCONTINUED | OUTPATIENT
Start: 2020-12-07 | End: 2020-12-09 | Stop reason: HOSPADM

## 2020-12-07 RX ORDER — ONDANSETRON 4 MG/1
4 TABLET, ORALLY DISINTEGRATING ORAL
Status: DISCONTINUED | OUTPATIENT
Start: 2020-12-07 | End: 2020-12-09 | Stop reason: HOSPADM

## 2020-12-07 RX ORDER — ACETAMINOPHEN 325 MG/1
975 TABLET ORAL ONCE
Status: DISCONTINUED | OUTPATIENT
Start: 2020-12-07 | End: 2020-12-07 | Stop reason: ALTCHOICE

## 2020-12-07 RX ORDER — HYDROMORPHONE HYDROCHLORIDE 2 MG/ML
0.5 INJECTION, SOLUTION INTRAMUSCULAR; INTRAVENOUS; SUBCUTANEOUS
Status: DISCONTINUED | OUTPATIENT
Start: 2020-12-07 | End: 2020-12-07 | Stop reason: HOSPADM

## 2020-12-07 RX ORDER — ONDANSETRON 2 MG/ML
INJECTION INTRAMUSCULAR; INTRAVENOUS AS NEEDED
Status: DISCONTINUED | OUTPATIENT
Start: 2020-12-07 | End: 2020-12-07 | Stop reason: HOSPADM

## 2020-12-07 RX ORDER — HYDROCODONE BITARTRATE AND ACETAMINOPHEN 5; 325 MG/1; MG/1
2 TABLET ORAL AS NEEDED
Status: DISCONTINUED | OUTPATIENT
Start: 2020-12-07 | End: 2020-12-07 | Stop reason: HOSPADM

## 2020-12-07 RX ORDER — CEFAZOLIN SODIUM/WATER 2 G/20 ML
2 SYRINGE (ML) INTRAVENOUS ONCE
Status: COMPLETED | OUTPATIENT
Start: 2020-12-07 | End: 2020-12-07

## 2020-12-07 RX ORDER — DIPHENHYDRAMINE HCL 25 MG
25 CAPSULE ORAL
Status: DISCONTINUED | OUTPATIENT
Start: 2020-12-07 | End: 2020-12-09 | Stop reason: HOSPADM

## 2020-12-07 RX ORDER — LIDOCAINE HYDROCHLORIDE 10 MG/ML
0.1 INJECTION INFILTRATION; PERINEURAL AS NEEDED
Status: DISCONTINUED | OUTPATIENT
Start: 2020-12-07 | End: 2020-12-07 | Stop reason: HOSPADM

## 2020-12-07 RX ORDER — EPHEDRINE SULFATE/0.9% NACL/PF 50 MG/5 ML
SYRINGE (ML) INTRAVENOUS AS NEEDED
Status: DISCONTINUED | OUTPATIENT
Start: 2020-12-07 | End: 2020-12-07 | Stop reason: HOSPADM

## 2020-12-07 RX ORDER — DEXAMETHASONE SODIUM PHOSPHATE 100 MG/10ML
10 INJECTION INTRAMUSCULAR; INTRAVENOUS ONCE
Status: ACTIVE | OUTPATIENT
Start: 2020-12-08 | End: 2020-12-09

## 2020-12-07 RX ORDER — FENTANYL CITRATE 50 UG/ML
100 INJECTION, SOLUTION INTRAMUSCULAR; INTRAVENOUS ONCE
Status: DISCONTINUED | OUTPATIENT
Start: 2020-12-07 | End: 2020-12-07 | Stop reason: HOSPADM

## 2020-12-07 RX ORDER — SODIUM CHLORIDE 9 MG/ML
100 INJECTION, SOLUTION INTRAVENOUS CONTINUOUS
Status: DISPENSED | OUTPATIENT
Start: 2020-12-07 | End: 2020-12-09

## 2020-12-07 RX ORDER — OXYBUTYNIN CHLORIDE 5 MG/1
5 TABLET, EXTENDED RELEASE ORAL DAILY
Status: DISCONTINUED | OUTPATIENT
Start: 2020-12-08 | End: 2020-12-09 | Stop reason: HOSPADM

## 2020-12-07 RX ORDER — ASPIRIN 81 MG/1
81 TABLET ORAL EVERY 12 HOURS
Status: DISCONTINUED | OUTPATIENT
Start: 2020-12-07 | End: 2020-12-09 | Stop reason: HOSPADM

## 2020-12-07 RX ORDER — TRANEXAMIC ACID 100 MG/ML
INJECTION, SOLUTION INTRAVENOUS AS NEEDED
Status: DISCONTINUED | OUTPATIENT
Start: 2020-12-07 | End: 2020-12-07 | Stop reason: HOSPADM

## 2020-12-07 RX ORDER — OXYCODONE HYDROCHLORIDE 5 MG/1
10 TABLET ORAL
Status: DISCONTINUED | OUTPATIENT
Start: 2020-12-07 | End: 2020-12-08

## 2020-12-07 RX ORDER — SODIUM CHLORIDE 0.9 % (FLUSH) 0.9 %
5-40 SYRINGE (ML) INJECTION AS NEEDED
Status: DISCONTINUED | OUTPATIENT
Start: 2020-12-07 | End: 2020-12-09 | Stop reason: HOSPADM

## 2020-12-07 RX ORDER — CEFAZOLIN SODIUM/WATER 2 G/20 ML
2 SYRINGE (ML) INTRAVENOUS EVERY 8 HOURS
Status: COMPLETED | OUTPATIENT
Start: 2020-12-07 | End: 2020-12-08

## 2020-12-07 RX ORDER — NALOXONE HYDROCHLORIDE 0.4 MG/ML
.2-.4 INJECTION, SOLUTION INTRAMUSCULAR; INTRAVENOUS; SUBCUTANEOUS
Status: DISCONTINUED | OUTPATIENT
Start: 2020-12-07 | End: 2020-12-09 | Stop reason: HOSPADM

## 2020-12-07 RX ORDER — OXYCODONE HYDROCHLORIDE 5 MG/1
5 TABLET ORAL
Status: DISCONTINUED | OUTPATIENT
Start: 2020-12-07 | End: 2020-12-07 | Stop reason: HOSPADM

## 2020-12-07 RX ORDER — HYDROMORPHONE HYDROCHLORIDE 1 MG/ML
1 INJECTION, SOLUTION INTRAMUSCULAR; INTRAVENOUS; SUBCUTANEOUS
Status: DISCONTINUED | OUTPATIENT
Start: 2020-12-07 | End: 2020-12-09 | Stop reason: HOSPADM

## 2020-12-07 RX ADMIN — ONDANSETRON 4 MG: 2 INJECTION INTRAMUSCULAR; INTRAVENOUS at 13:15

## 2020-12-07 RX ADMIN — Medication 1 AMPULE: at 20:35

## 2020-12-07 RX ADMIN — CEFAZOLIN SODIUM 2 G: 100 INJECTION, POWDER, LYOPHILIZED, FOR SOLUTION INTRAVENOUS at 21:57

## 2020-12-07 RX ADMIN — INSULIN HUMAN 4 UNITS: 100 INJECTION, SOLUTION PARENTERAL at 21:56

## 2020-12-07 RX ADMIN — DEXAMETHASONE SODIUM PHOSPHATE 10 MG: 4 INJECTION, SOLUTION INTRAMUSCULAR; INTRAVENOUS at 13:29

## 2020-12-07 RX ADMIN — BUPIVACAINE HYDROCHLORIDE IN DEXTROSE 14 MG: 7.5 INJECTION, SOLUTION SUBARACHNOID at 13:18

## 2020-12-07 RX ADMIN — SODIUM CHLORIDE, SODIUM LACTATE, POTASSIUM CHLORIDE, AND CALCIUM CHLORIDE 75 ML/HR: 600; 310; 30; 20 INJECTION, SOLUTION INTRAVENOUS at 11:27

## 2020-12-07 RX ADMIN — TRANEXAMIC ACID 1 G: 100 INJECTION, SOLUTION INTRAVENOUS at 13:13

## 2020-12-07 RX ADMIN — Medication 10 MG: at 13:36

## 2020-12-07 RX ADMIN — PROMETHAZINE HYDROCHLORIDE 12.5 MG: 25 INJECTION INTRAMUSCULAR; INTRAVENOUS at 23:38

## 2020-12-07 RX ADMIN — FENTANYL CITRATE 25 MCG: 50 INJECTION INTRAMUSCULAR; INTRAVENOUS at 13:14

## 2020-12-07 RX ADMIN — DEXAMETHASONE SODIUM PHOSPHATE 10 MG: 4 INJECTION, SOLUTION INTRAMUSCULAR; INTRAVENOUS at 14:01

## 2020-12-07 RX ADMIN — Medication 10 ML: at 21:58

## 2020-12-07 RX ADMIN — ACETAMINOPHEN 1000 MG: 500 TABLET, FILM COATED ORAL at 16:46

## 2020-12-07 RX ADMIN — FENTANYL CITRATE 25 MCG: 50 INJECTION INTRAMUSCULAR; INTRAVENOUS at 13:16

## 2020-12-07 RX ADMIN — ONDANSETRON 4 MG: 4 TABLET, ORALLY DISINTEGRATING ORAL at 18:06

## 2020-12-07 RX ADMIN — Medication 2 G: at 13:20

## 2020-12-07 RX ADMIN — Medication 81 MG: at 20:35

## 2020-12-07 RX ADMIN — ACETAMINOPHEN 1000 MG: 500 TABLET, FILM COATED ORAL at 23:38

## 2020-12-07 RX ADMIN — GABAPENTIN 100 MG: 100 CAPSULE ORAL at 21:57

## 2020-12-07 RX ADMIN — Medication 3 AMPULE: at 11:26

## 2020-12-07 RX ADMIN — PROPOFOL 50 MCG/KG/MIN: 10 INJECTION, EMULSION INTRAVENOUS at 13:21

## 2020-12-07 RX ADMIN — HYDROMORPHONE HYDROCHLORIDE 1 MG: 1 INJECTION, SOLUTION INTRAMUSCULAR; INTRAVENOUS; SUBCUTANEOUS at 20:35

## 2020-12-07 RX ADMIN — SODIUM CHLORIDE, SODIUM LACTATE, POTASSIUM CHLORIDE, AND CALCIUM CHLORIDE: 600; 310; 30; 20 INJECTION, SOLUTION INTRAVENOUS at 14:06

## 2020-12-07 RX ADMIN — LIDOCAINE HYDROCHLORIDE 0.1 ML: 10 INJECTION, SOLUTION INFILTRATION; PERINEURAL at 11:26

## 2020-12-07 RX ADMIN — ONDANSETRON 4 MG: 4 TABLET, ORALLY DISINTEGRATING ORAL at 22:03

## 2020-12-07 RX ADMIN — OXYCODONE HYDROCHLORIDE 10 MG: 5 TABLET ORAL at 16:46

## 2020-12-07 NOTE — PERIOP NOTES
TRANSFER - OUT REPORT:    Verbal report given to [address] (name) on William Stiles  being transferred to [facility] (unit) for routine post - op       Report consisted of patients Situation, Background, Assessment and   Recommendations(SBAR). Information from the following report(s) SBAR was reviewed with the receiving nurse. Lines:   Peripheral IV 94/84/17 Left Cephalic (Active)   Site Assessment Clean, dry, & intact 12/07/20 1542   Phlebitis Assessment 0 12/07/20 1542   Infiltration Assessment 0 12/07/20 1542   Dressing Status Clean, dry, & intact 12/07/20 1542   Dressing Type Tape;Transparent 12/07/20 1542   Hub Color/Line Status Infusing 12/07/20 1542   Action Taken Blood drawn 12/07/20 1124        Opportunity for questions and clarification was provided.       Patient transported with:   chart, IV

## 2020-12-07 NOTE — OP NOTES
Total Hip Procedure Note    Wero Ventura,  [id number],  10/11/1930    Pre-operative Diagnosis:  Primary osteoarthritis of right hip [M16.11]  Post-operative Diagnosis: <principal problem not specified>    Procedure: Procedure(s) (LRB):  RIGHT HIP ARTHROPLASTY TOTAL/ CEMENTED TOMMYKHADIJAH NELSON (Right)  CPT Code: 93488  Location: [address] [place] [address]  Surgeon: Dylan Shen MD  Assistant: Roland Nunn    Anesthesia: Spinal  Findings: severe degenerative arthritis, acetabular osteophytes and bone spurs around the femoral head. EBL: 300cc  BMI: Body mass index is 26.88 kg/m². Procedure: The complexity of total joint surgery requires the use of a first assistant for positioning, retraction and expertise in closure. Wero Ventura was brought to the operating room and positioned on the operating table. Anesthesia was administered. IV antibiotics were administered. A time out identifying the patient, procedure, operative side and surgeon was administered and charted by the OR Nurse. The patient was positioned on the contralateral decubitus position. The limb was prepped and draped in the usual sterile fashion. A posterior approach was utilized to expose the hip. The incision was carried through the subcutaneous tissue and underlying fascia with hemostasis obtained using the bovie cauterization. A Charnley retractor was inserted. The short external rotators were divided at their insertion. The sciatic nerve was palpated and identified. Next, a T-shaped capsular incision was accomplished and femoral head was dislocated. The neck was osteotomized a measured distance above the lesser trochanteric region. The neck cut was measured. Acetabular retractors were placed and the appropriate capsulotomy performed. Soft tissue was removed from the acetabulum. The acetabulum was sequentially reamed. Using trial components the acetabular component was sized.  The acetabular component was impacted at approximately 40 degrees horizontal tilt and 20 degrees anteversion. One 6.5 mm screw was used to fixate the cup. The real polyethylene liner was impacted into position. Utilizing the femoral retractor, the canal was prepared with appropriate lateralization and reamed with the starter reamer. The canal was then broached progressively to accommodate the cemented [place] stem. The broach was positioned with the anatomic femoral anteversion. Trials were preformed using various neck length until the most suitable one was determined and found to be stable to flexion greater than 90 degrees and on internal and external rotation. Limb lengths were thought to be equilibrated and with appropriate stability as mentioned above. All trial components were removed. The femoral canal was then prepared for cementing. A cement plug was inserted to prevent cement from going down the canal. The two packages of bone cement were mixed in the cement gun. The canal was pressurized with the cement and permanent stem was impacted into place. The cement was allowed to harden. Once it was completely harden a trial reduction was performed with the appropriate neck length dual mobility head. The permanent dual-mobility  femoral head was impacted into place. The hip was reduced and the stability was as mentioned as above. Copious irrigation was accomplished about the surgical site. The sciatic nerve was palpated and noted to be intact. The capsule was repaired with an absorbable suture and the external rotators were reattached with a #5 Mersilene. The operative hip was injected with 60 cc of Neuropin, 1cc of 10 mg of morphine, and 1 cc of 30 mg of Toradol. The Hip was then soaked with a diluted betadine solution for approximately 3 minutes and then thoroughly irrigated. A #1 PDS suture was used to re-approximate the fascia. Then, 1 gram (100 mg/ml) of Transexamic Acid was injected into the joint space.   An insorb stapler with absorbable sutures were used to approximate the subcutaneous layers. Staples were applied in an occlusive fashion and a sterile bandage was placed. The patient was then rolled to a supine position. The sponge, needle and instrument counts were correct. The patient tolerated the procedure without difficulty and left the operating room in satisfactory condition. Implants:   Implant Name Type Inv.  Item Serial No.  Lot No. LRB No. Used Action   CEMENT BNE RP FL DOSE 40GM -- SIMPLEX P SNGL/EA ONLY - [id number]  CEMENT BNE RP FL DOSE 40GM -- SIMPLEX P SNGL/EA ONLY  [employer] [facility] [id number] Right 1 Implanted   CEMENT BNE RP FL DOSE 40GM -- SIMPLEX P SNGL/EA ONLY - [id number]  CEMENT BNE RP FL DOSE 40GM -- SIMPLEX P SNGL/EA ONLY  [employer] [facility] [id number] Right 1 Implanted   SHELL ACET CLUS H 54E TRTANIUM -- TRIDENT II - [id number]  SHELL ACET CLUS H 54E TRTANIUM -- TRIDENT II  [employer] [facility] [id number] Right 1 Implanted   LINER MDM COCR 42MM E --  - [id number]  LINER MDM COCR 42MM E --   [employer] [facility] [id number] Right 1 Implanted   SCR HEX 6.5X30MM -- TRIDENT II - [id number]  SCR HEX 6.5X30MM -- TRIDENT II  [employer] [facility] [id number] Right 1 Implanted   STEM FEM NOEL SZ 3 37.5MM OFFST -- EXETER V40 - [id number]  STEM FEM NOEL SZ 3 37.5MM OFFST -- EXETER V40 [id number] [employer] [facility] [id number] Right 1 Implanted   PLUG BNE SZ 4 DIA15.5MM TOT HIP POLYMER IM NOEL RESTRIC - [id number]  PLUG BNE SZ 4 DIA15.5MM TOT HIP POLYMER IM NOEL RESTRIC [id number] [employer] [facility] [id number] Right 1 Implanted   INSERT ACET CUP X3 [id number] -- RESTORATION ADM - [id number]  INSERT ACET CUP X3 [id number] -- RESTORATION ADM [id number] [employer] [facility] [id number] Right 1 Implanted   STEM FEM SZ 4 L150MM 375MM OFFSET V40 TAPR ORTHNX HIP - [id number]  STEM FEM SZ 4 L150MM 375MM OFFSET V40 TAPR ORTHNX HIP [id number] [employer] HOWM[facility]WD [id number] Right 1 Implanted   HEAD FEM DELT V40 0MM NK 28MM -- V40 BIOLOX - [id number]  HEAD FEM DELT V40 0MM NK 28MM -- V40 BIOLOX [id number] [employer] HOW[employer]WD [id number] Right 1 Implanted           Signed By: Marie Rockwell MD  12/7/2020,  2:49 PM

## 2020-12-07 NOTE — H&P
The patient has end stage arthritis of the right hip. The patient was see and examined and there are no changes to the patient's orthopedic condition. They have tried conservative treatment for this condition; including antiinflammatories and lifestyle modifications and have failed. The necessity for the joint replacement is still present, and the H&P from the office is still current.  The patient will be admitted today for Procedure(s) (LRB):  RIGHT HIP ARTHROPLASTY TOTAL/ CEMENTED TOMMY NELSON (Right)

## 2020-12-07 NOTE — ANESTHESIA PROCEDURE NOTES
Spinal Block    Start time: 12/7/2020 1:17 PM  End time: 12/7/2020 1:19 PM  Performed by: Dixon Blankenship MD  Authorized by: Dixon Blankenship MD     Pre-procedure:   Indications: at surgeon's request and primary anesthetic  Preanesthetic Checklist: patient identified, risks and benefits discussed, anesthesia consent, site marked, patient being monitored and timeout performed    Timeout Time: 13:16          Spinal Block:   Patient Position:  Seated  Prep Region:  Lumbar  Prep: DuraPrep      Location:  L2-3  Technique:  Single shot    Local Dose (mL):  3    Needle:   Needle Type:  Quincke  Needle Gauge:  22 G  Attempts:  1      Events: CSF confirmed, no blood with aspiration and no paresthesia        Assessment:  Insertion:  Uncomplicated  Patient tolerance:  Patient tolerated the procedure well with no immediate complications

## 2020-12-07 NOTE — PROGRESS NOTES
TRANSFER - IN REPORT:    Verbal report received from Ai Darnell on Wero Ventura  being received from PACU) for routine post - op      Report consisted of patients Situation, Background, Assessment and   Recommendations(SBAR). Information from the following report(s) SBAR was reviewed with the receiving nurse. Opportunity for questions and clarification was provided.

## 2020-12-07 NOTE — CONSULTS
Consult    Patient: Cady Lerner MRN: [id number]  SSN: [id number]    YOB: 1930  Age: 80 y.o. Sex: female      Subjective:      Cady Lerner is a 80 y.o. female who is being seen for medical management. Patient just had hip arthroplasty. PMH is listed below with hypertension, DM and hyperlipidemia. Past Medical History:   Diagnosis Date    Arthralgia     Chronic kidney disease     CKD (chronic kidney disease), stage III     GFR 30-59; followed by Dr Octavio Odom nephrologist; 11/2/20:  BUN 30; Creatinine 1.66    Essential hypertension     Hip pain     Hyperlipidemia     Mild aortic valve regurgitation     and sclerosis; ECHO 2/25/19 in [facility]     ECHO 2/25/19 in [address] Neuropathy     legs    Overactive bladder     followed by urology    Primary osteoarthritis of both knees     Right bundle branch block     Type 2 diabetes mellitus ([facility] [address]     stable~ Hgb A1C 11/2/20:  6.6; takes Glipizide daily     Past Surgical History:   Procedure Laterality Date    HX BACK SURGERY  1992    removal of deteriorated disc with fusion L4-5; Multilevel lumbar spondylosis, with marginal endplate osteophytes some loss of disc height at all levels. Bilateral facet hypertrophy in the lower lumbar spine.     HX CATARACT REMOVAL Bilateral     HX FRACTURE TX      HX NEPHRECTOMY Right 03/21/2018    radical path:  Oncocytoma    HX OTHER SURGICAL  01/29/2019    cystoscopy      Family History   Problem Relation Age of Onset    Heart Disease Mother     Lung Disease Father      Social History     Tobacco Use    Smoking status: Never Smoker    Smokeless tobacco: Never Used   Substance Use Topics    Alcohol use: Never     Frequency: Never      Current Facility-Administered Medications   Medication Dose Route Frequency Provider Last Rate Last Dose    [START ON 12/8/2020] lisinopriL (PRINIVIL, ZESTRIL) tablet 5 mg  5 mg Oral DAILY JESSA Perez       [address] [place] [address] gabapentin (NEURONTIN) capsule 100 mg  100 mg Oral TID JESSA Childress        [START ON 12/8/2020] glipiZIDE SR (GLUCOTROL XL) tablet 5 mg  5 mg Oral DAILY Kip Childress        [START ON 12/8/2020] hydroCHLOROthiazide (MICROZIDE) capsule 12.5 mg  12.5 mg Oral DAILY Kip Childress       [facility] . PHARMACY TO SUBSTITUTE PER PROTOCOL (Reordered from: solifenacin (VESICARE) 5 mg tablet)    Per Protocol JESSA Childress        alcohol 62% (NOZIN) nasal  1 Ampule  1 Ampule Topical Q12H Kip Childress        0.9% sodium chloride infusion  100 mL/hr IntraVENous CONTINUOUS Kip Childress        sodium chloride (NS) flush 5-40 mL  5-40 mL IntraVENous Q8H Gini [address]ress[address]        sodium chloride (NS) flush 5-40 mL  5-40 mL IntraVENous PRN JESSA Childress        ceFAZolin (ANCEF) 2 g/20 mL in sterile water IV syringe  2 g IntraVENous Q8H Kip Childress        acetaminophen (TYLENOL) tablet 1,000 mg  1,000 mg Oral Q6H JESSA Childress   1,000 mg at 12/07/20 1646    oxyCODONE IR (ROXICODONE) tablet 10 mg  10 mg Oral Q4H PRN JESSA Childress   10 mg at 12/07/20 1646    HYDROmorphone (PF) (DILAUDID) injection 1 mg  1 mg IntraVENous Q3H PRN JESSA Childress        naloxone [facility]) injection 0.2-0.4 mg  0.2-0.4 mg IntraVENous Q10MIN PRN JESSA Childress        [START ON 12/8/2020] dexamethasone (DECADRON) 10 mg/mL injection 10 mg  10 mg IntraVENous Maya Craig, [address]        ondansetron (ZOFRAN ODT) tablet 4 mg  4 mg Oral Q4H PRN JESSA Childress        diphenhydrAMINE (BENADRYL) capsule 25 mg  25 mg Oral Q4H PRN JESSA Childress        [START ON 12/8/2020] senna-docusate (PERICOLACE) 8.6-50 mg per tablet 2 Tab  2 Tab Oral DAILY Kip Childress        aspirin delayed-release tablet 81 mg  81 mg Oral Q12H Kip Childress            Allergies   Allergen Reactions    Fesoterodine Other (comments)     Caused stomach issues         Objective:     Vitals:    12/07/20 1524 12/07/20 1529 12/07/20 1544 12/07/20 1621   BP: (!) 129/57 (!) 158/70 138/60 (!) 179/62   Pulse: 62 61 61 62   Resp: 18 18 18 18   Temp:    97.8 °F (36.6 °C)   SpO2: 98% 99% 98% 92%   Weight:       Height:          Medications are reviewed and reconciled. Recent Results (from the past 24 hour(s))   POTASSIUM    Collection Time: 12/07/20 11:37 AM   Result Value Ref Range    Potassium 5.1 3.5 - 5.1 mmol/L   GLUCOSE, POC    Collection Time: 12/07/20 11:40 AM   Result Value Ref Range    Glucose (POC) 100 65 - 100 mg/dL   GLUCOSE, POC    Collection Time: 12/07/20  3:54 PM   Result Value Ref Range    Glucose (POC) 115 (H) 65 - 100 mg/dL     METABOLIC PANEL, BASIC [[id number]] (Order [id number])   Lab   Date: 11/16/2020  Department: [facility] Pre Assessment  Released By: Mehdi Zhu (auto-released)  Authorizing: JESSA Bee    Component  Value  Flag  Ref Range  Units  Status    Sodium  143   136 - 145  mmol/L  Final    Potassium  4.4   3.5 - 5.1  mmol/L  Final    Chloride  109  High    98 - 107  mmol/L  Final    CO2  30   21 - 32  mmol/L  Final    Anion gap  4  Low    7 - 16  mmol/L  Final    Glucose  87   65 - 100  mg/dL  Final    Comment:    47 - 60 mg/dl Consistent with, but not fully diagnostic of hypoglycemia. 101 - 125 mg/dl Impaired fasting glucose/consistent with pre-diabetes mellitus   > 126 mg/dl Fasting glucose consistent with overt diabetes mellitus    BUN  29  High    8 - 23  MG/DL  Final    Creatinine  1.48  High    0.6 - 1.0  MG/DL  Final    GFR est AA  43  Low    >60  ml/min/1.73m2  Final    GFR est non-AA  35  Low    >60  ml/min/1.73m2  Final    Comment:    (NOTE)   Estimated GFR is calculated using the Modification of Diet in Renal   Disease (MDRD) Study equation, reported for both  Americans   (GFRAA) and non- Americans (GFRNA), and normalized to 1.73m2   body surface area. The physician must decide which value applies to   the patient.  The MDRD study equation should only be used in individuals age 25 or older. It has not been validated for the   following: pregnant women, patients with serious comorbid conditions,   or on certain medications, or persons with extremes of body size,   muscle mass, or nutritional status. Calcium  9.1   8.3 - 10.4  MG/DL  Final      CBC WITH AUTOMATED DIFF [[id number]] (Order [id number])   Lab   Date: 11/16/2020  Department: [facility] Pre Assessment  Released By: Richar Yadav (auto-released)  Authorizing: JESSA Oconnell    Component  Value  Flag  Ref Range  Units  Status    WBC  10.5   4.3 - 11.1  K/uL  Final    RBC  4.80   4.05 - 5.2  M/uL  Final    HGB  15.6  High    11.7 - 15.4  g/dL  Final    HCT  48.5  High    35.8 - 46.3  %  Final    MCV  101.0  High    79.6 - 97.8  FL  Final    MCH  32.5   26.1 - 32.9  PG  Final    MCHC  32.2   31.4 - 35.0  g/dL  Final    RDW  12.9   11.9 - 14.6  %  Final    PLATELET  628   674 - 450  K/uL  Final    MPV  10.9   9.4 - 12.3  FL  Final    ABSOLUTE NRBC  0.00   0.0 - 0.2  K/uL  Final    Comment:    **Note: Absolute NRBC parameter is now reported with Hemogram**    DF  AUTOMATED       Final    NEUTROPHILS  63   43 - 78  %  Final    LYMPHOCYTES  26   13 - 44  %  Final    MONOCYTES  7   4.0 - 12.0  %  Final    EOSINOPHILS  4   0.5 - 7.8  %  Final    BASOPHILS  1   0.0 - 2.0  %  Final    IMMATURE GRANULOCYTES  1   0.0 - 5.0  %  Final    ABS. NEUTROPHILS  6.5   1.7 - 8.2  K/UL  Final    ABS. LYMPHOCYTES  2.7   0.5 - 4.6  K/UL  Final    ABS. MONOCYTES  0.7   0.1 - 1.3  K/UL  Final    ABS. EOSINOPHILS  0.4   0.0 - 0.8  K/UL  Final    ABS. BASOPHILS  0.1   0.0 - 0.2  K/UL  Final    ABS. IMM.  GRANS.  0.1   0.0 - 0.5  K/UL  Final        Current Facility-Administered Medications:     [START ON 12/8/2020] lisinopriL (PRINIVIL, ZESTRIL) tablet 5 mg, 5 mg, Oral, DAILY, Tessy, TAVELSJÖ, [place]    gabapentin (NEURONTIN) capsule 100 mg, 100 mg, Oral, TID, JESSA Oconnell    [START ON 12/8/2020] glipiZIDE SR (GLUCOTROL XL) tablet 5 mg, 5 mg, Oral, DAILY, Miguel Malin    [START ON 12/8/2020] hydroCHLOROthiazide (MICROZIDE) capsule 12.5 mg, 12.5 mg, Oral, DAILY, Miguel Malin    . PHARMACY TO SUBSTITUTE PER PROTOCOL (Reordered from: solifenacin (VESICARE) 5 mg tablet), , , Per Protocol, JESSA Malin    alcohol 62% (NOZIN) nasal  1 Ampule, 1 Ampule, Topical, Q12H, JESSA Malin    0.9% sodium chloride infusion, 100 mL/hr, IntraVENous, CONTINUOUS, JESSA Malin    sodium chloride (NS) flush 5-40 mL, 5-40 mL, IntraVENous, Q8H, Miguel Malin    sodium chloride (NS) flush 5-40 mL, 5-40 mL, IntraVENous, PRN, JESSA Malin    ceFAZolin (ANCEF) 2 g/20 mL in sterile water IV syringe, 2 g, IntraVENous, Q8H, JESSA Malin    acetaminophen (TYLENOL) tablet 1,000 mg, 1,000 mg, Oral, Q6H, JESSA Malin, 1,000 mg at 12/07/20 1646    oxyCODONE IR (ROXICODONE) tablet 10 mg, 10 mg, Oral, Q4H PRN, JESSA Malin, 10 mg at 12/07/20 1646    HYDROmorphone (PF) (DILAUDID) injection 1 mg, 1 mg, IntraVENous, Q3H PRN, JESSA Malin    naloxone [facility]) injection 0.2-0.4 mg, 0.2-0.4 mg, IntraVENous, Q10MIN PRN, JESSA Malin    [START ON 12/8/2020] dexamethasone (DECADRON) 10 mg/mL injection 10 mg, 10 mg, IntraVENous, ONCE, JESSA Malin    ondansetron (ZOFRAN ODT) tablet 4 mg, 4 mg, Oral, Q4H PRN, JESSA Malin    diphenhydrAMINE (BENADRYL) capsule 25 mg, 25 mg, Oral, Q4H PRN, JESSA Malin    [START ON 12/8/2020] senna-docusate (PERICOLACE) 8.6-50 mg per tablet 2 Tab, 2 Tab, Oral, DAILY, [place], [place]    aspirin delayed-release tablet 81 mg, 81 mg, Oral, Q12H, [place], [place]      Assessment:     Hospital Problems  Date Reviewed: 12/2/2020          Codes Class Noted POA    Osteoarthritis of right hip ICD-10-CM: M16.11  ICD-9-CM: 715.95  12/7/2020 Unknown        * (Principal) Status post right hip replacement ICD-10-CM: Q33.080  ICD-9-CM: V43.64  12/7/2020 Unknown        Diabetes mellitus type 2, controlled ([facility] ICD-10-CM: E11.9  ICD-9-CM: 250.00  12/7/2020 Unknown        Hypertension ICD-10-CM: I10  ICD-9-CM: 401.9  12/7/2020 Unknown        Hyperlipidemia ICD-10-CM: E78.5  ICD-9-CM: 272.4  12/7/2020 Unknown              Plan:     S/P arthroplasty   Post-op care including DVT prophylaxis as per orthopedics service. Diabetes mellitus type 2  Monitor blood sugar. Cover with insulin sliding scale accordingly. Hypertension  Monitor blood pressure and manage accordingly. Continue home medications. Dyslipidemia  Continue home medications. I have signed off. If further help is needed, please call.        Signed By: Wolf Gr MD     December 7, 2020

## 2020-12-07 NOTE — PROGRESS NOTES
12/07/20 1728   Oxygen Therapy   O2 Sat (%) 96 %   Pulse via Oximetry 67 beats per minute   O2 Device Room air   O2 Flow Rate (L/min) 0 l/min   Incentive Spirometry Treatment   Actual Volume (ml) 1500 ml   Patient encouraged to do 10 breaths every hour while awake-patient agreed and demonstrated. No shortness of breath or distress noted. BS are clear b/l. [facility] notes reviewed- Sat monitor ordered HS.

## 2020-12-07 NOTE — PROGRESS NOTES
Problem: Mobility Impaired (Adult and Pediatric)  Goal: *Acute Goals and Plan of Care (Insert Text)  Note: GOALS (1-4 days):  (1.)Ms. Monserrat Yanez will move from supine to sit and sit to supine  in bed with INDEPENDENT. (2.)Ms. Monserrat Yanez will transfer from bed to chair and chair to bed with INDEPENDENT using the least restrictive device. (3.)Ms. Monserrat Yanez will ambulate with INDEPENDENT for 200 feet with the least restrictive device. (4.)Ms. Monserrat Yanez will ambulate up/down 3 steps with bilateral  railing with MINIMAL ASSIST with no device. (5.)Ms. Monserrat Yanez will state/observe MARLEY precautions with 0 verbal cues. ________________________________________________________________________________________________       PHYSICAL THERAPY [facility] [place]: Initial Assessment 12/7/2020  INPATIENT: Hospital Day: 1  [place] / [place] / Product Type: Medicare /      NAME/AGE/GENDER: Gregoria Nyhan is a 80 y.o. female   PRIMARY DIAGNOSIS:  Primary osteoarthritis of right hip [M16.11]   Procedure(s) and Anesthesia Type:     * RIGHT HIP ARTHROPLASTY TOTAL/ CEMENTED TOMMY EXETER - Spinal (Right)  ICD-10: Treatment Diagnosis:    Pain in Right Hip (M25.551)  Stiffness of Right Hip, Not elsewhere classified (M25.651)  Difficulty in walking, Not elsewhere classified (R26.2)  Other abnormalities of gait and mobility (R26.89)      ASSESSMENT:     Ms. Monserrat Yanez presents with decreased independence with functional mobility. Pt performed supine to sit to stand. Pt ambulated in room. Pt performed exercises in bedside chair. Pt in bedside chair with needs in reach. Pt's daughter present.     This section established at most recent assessment   PROBLEM LIST (Impairments causing functional limitations):  Decreased Strength  Decreased Transfer Abilities  Decreased Ambulation Ability/Technique  Decreased Balance  Increased Pain  Decreased Activity Tolerance  Decreased Flexibility/Joint Mobility  Decreased strength   INTERVENTIONS PLANNED: (Benefits and precautions of physical therapy have been discussed with the patient.)  Bed Mobility  Cold  Gait Training  Home Exercise Program (HEP)  Range of Motion (ROM)  Therapeutic Activites  Therapeutic Exercise/Strengthening  Transfer Training     TREATMENT PLAN: Frequency/Duration: Follow patient BID for duration of hospital stay to address above goals. Rehabilitation Potential For Stated Goals: Good     RECOMMENDED REHABILITATION/EQUIPMENT: (at time of discharge pending progress): Continue Skilled Therapy and Home Health: Physical Therapy. HISTORY:   History of Present Injury/Illness (Reason for Referral):  Pt is status post right total hip replacement. Past Medical History/Comorbidities:   Ms. Marina Whitman  has a past medical history of Arthralgia, Chronic kidney disease, CKD (chronic kidney disease), stage III, Essential hypertension, Hip pain, Hyperlipidemia, Mild aortic valve regurgitation, Murmur, Neuropathy, Overactive bladder, Primary osteoarthritis of both knees, Right bundle branch block, and Type 2 diabetes mellitus ([facility] [address] She also has no past medical history of CAD (coronary artery disease). Ms. Marina Whitman  has a past surgical history that includes hx fracture tx; hx nephrectomy (Right, 03/21/2018); hx cataract removal (Bilateral); hx other surgical (01/29/2019); and hx back surgery (1992). Social History/Living Environment:   Home Environment: Private residence  # Steps to Enter: 2  Rails to Enter: Yes  Hand Rails : Right  One/Two Story Residence: One story (with basement-13 steps)  Living Alone: Yes  Support Systems: Family member(s)  Patient Expects to be Discharged to[de-identified] Private residence  Current DME Used/Available at Home: [address] [facility] [place], straight;Raised toilet seat; Shower chair  Tub or Shower Type: Shower    Prior Level of Function/Work/Activity:  Pt is independent with ambulation.    Number of Personal Factors/Comorbidities that affect the Plan of Care: 0: LOW COMPLEXITY   EXAMINATION: Most Recent Physical Functioning:   Gross Assessment: Yes  Gross Assessment  AROM: Generally decreased, functional  Strength: Generally decreased, functional  Sensation: Intact                     Bed Mobility  Supine to Sit: Minimum assistance  Scooting: Minimum assistance    Transfers  Sit to Stand: Minimum assistance  Stand to Sit: Minimum assistance  Bed to Chair: Minimum assistance    Balance  Sitting: Intact  Standing: Pull to stand; With support    Posture  Posture (WDL): Within defined limits            Distance (ft): 25 Feet (ft)  Ambulation - Level of Assistance: Minimal assistance  Assistive Device: Walker, rolling  Gait Abnormalities: Antalgic        Braces/Orthotics: none    Right Hip Cold  Type: Cold/ice packs      Body Structures Involved:  Bones  Joints  Muscles  Ligaments Body Functions Affected:  Neuromusculoskeletal  Movement Related Activities and Participation Affected: Mobility   Number of elements that affect the Plan of Care: 4+: HIGH COMPLEXITY   CLINICAL PRESENTATION:   Presentation: Stable and uncomplicated: LOW COMPLEXITY   CLINICAL DECISION MAKIN[address] [place], [place] [id number] AM-PAC 6 Clicks   Basic Mobility Inpatient Short Form  How much difficulty does the patient currently have. .. Unable A Lot A Little None   1. Turning over in bed (including adjusting bedclothes, sheets and blankets)? [] 1   [] 2   [x] 3   [] 4   2. Sitting down on and standing up from a chair with arms ( e.g., wheelchair, bedside commode, etc.)   [] 1   [] 2   [x] 3   [] 4   3. Moving from lying on back to sitting on the side of the bed? [] 1   [] 2   [x] 3   [] 4   How much help from another person does the patient currently need. .. Total A Lot A Little None   4. Moving to and from a bed to a chair (including a wheelchair)? [] 1   [] 2   [x] 3   [] 4   5. Need to walk in hospital room? [] 1   [] 2   [x] 3   [] 4   6. Climbing 3-5 steps with a railing?    [] 1   [] 2   [x] 3   [] 4   © 2007, Trustees of [facility], under license to [employer]. All rights reserved     Score:  Initial: 18 Most Recent: X (Date: -- )    Interpretation of Tool:  Represents activities that are increasingly more difficult (i.e. Bed mobility, Transfers, Gait). Medical Necessity:     Skilled intervention continues to be required due to decreased mobility ability. Reason for Services/Other Comments:  Patient continues to require skilled intervention due to   Decreased mobility ability. .   Use of outcome tool(s) and clinical judgement create a POC that gives a: Clear prediction of patient's progress: LOW COMPLEXITY            TREATMENT:   (In addition to Assessment/Re-Assessment sessions the following treatments were rendered)     Pre-treatment Symptoms/Complaints:    Pain Initial:      Post Session:  pain in right hip, improved with movement     Therapeutic Exercise: ( 15 minutes):  Exercises per grid below to improve mobility. Assessment   Date:  12/7 Date:   Date:     ACTIVITY/EXERCISE AM PM AM PM AM PM   GROUP THERAPY  []  []  []  []  []  []   Ankle Pumps  10       Quad Sets  10       Gluteal Sets  10       Hip ABd/ADduction  10       Straight Leg Raises         Knee Slides  10       Short Arc Quads         Long Arc Quads         Chair Slides                  B = bilateral; AA = active assistive; A = active; P = passive      Treatment/Session Assessment:     Response to Treatment:  Pt agreeable to exercise and ambulate with therapy.     Education:  [x] Home Exercises  [x] Fall Precautions  [x] Hip Precautions [] D/C Instruction Review  [] Hip Prosthesis Review  [x] Walker Management/Safety [] Adaptive Equipment as Needed       Interdisciplinary Collaboration:   Physical Therapist  Occupational Therapist  Registered Nurse    After treatment position/precautions:   Up in chair  Bed/Chair-wheels locked  Bed in low position  Caregiver at bedside  Call light within reach  RN notified    Compliance with Program/Exercises: Compliant most of the time, Will assess as treatment progresses. Recommendations/Intent for next treatment session:  Treatment next visit will focus on increasing Ms. Calderon's independence with bed mobility, transfers, gait training, strength/ROM exercises, modalities for pain, and patient education.       Total Treatment Duration:  PT Patient Time In/Time Out  Time In: 1700  Time Out: [address], PT

## 2020-12-07 NOTE — PROGRESS NOTES
Problem: Self Care Deficits Care Plan (Adult)  Goal: *Acute Goals and Plan of Care (Insert Text)  Description: GOALS:   DISCHARGE GOALS (in preparation for going home/rehab):  3 days  1. Ms. Nani Wheeler will perform one lower body dressing activity with minimal assistance with adaptive equipment to demonstrate improved functional mobility and safety. 2.  Ms. Nani Wheeler will perform one lower body bathing activity with minimal  assistance with adaptive equipment to demonstrate improved functional mobility and safety. 3.  Ms. Nani Wheeler will perform toileting/toilet transfer with contact guard assistance with adaptive equipment to demonstrate improved functional mobility and safety. 4.  Ms. Nani Wheeler will perform shower transfer with contact guard assistance with adaptive equipment to demonstrate improved functional mobility and safety. 5.  Ms. Nani Wheeler will state MARLEY precautions with two verbal cues to demonstrate improved functional mobility and safety. JOINT CAMP OCCUPATIONAL THERAPY MARLEY: Initial Assessment and Daily Note 12/7/2020  INPATIENT: Hospital Day: 1  [place] / [place] / Product Type: Medicare /      NAME/AGE/GENDER: Seth Jacobo is a 80 y.o. female   PRIMARY DIAGNOSIS:  Primary osteoarthritis of right hip [M16.11]   Procedure(s) and Anesthesia Type:     * RIGHT HIP ARTHROPLASTY TOTAL/ CEMENTED TOMMY EXETER - Spinal (Right)  ICD-10: Treatment Diagnosis:    Pain in Right Hip (M25.551)  Stiffness of Right Hip, Not elsewhere classified (M25.651)      ASSESSMENT:     Ms. Nani Wheeler is s/p Right MARLEY and presents with decreased weight bearing on R LE and decreased independence with functional mobility and activities of daily living as compared to prior level of function and safety. Patient would benefit from skilled Occupational Therapy to maximize independence and safety with self-care task and functional mobility.   Pt would also benefit from education on lower body adaptive equipment and hip precautions post-surgery in preparation for going home. Patient able to don gown at edge of bed. Mobilized from bed to recliner using a rolling walker. Should progress well with ADL's tomorrow. This section established at most recent assessment   PROBLEM LIST (Impairments causing functional limitations):  Decreased Strength  Decreased ADL/Functional Activities  Decreased Transfer Abilities  Increased Pain  Increased Fatigue  Decreased Flexibility/Joint Mobility  Decreased Knowledge of Precautions   INTERVENTIONS PLANNED: (Benefits and precautions of occupational therapy have been discussed with the patient.)  Activities of daily living training  Adaptive equipment training  Balance training  Clothing management  Donning&doffing training  Theraputic activity     TREATMENT PLAN: Frequency/Duration: Follow patient 1-2tx to address above goals. Rehabilitation Potential For Stated Goals: Good     RECOMMENDED REHABILITATION/EQUIPMENT: (at time of discharge pending progress): Continue Skilled Therapy. OCCUPATIONAL PROFILE AND HISTORY:   History of Present Injury/Illness (Reason for Referral): Pt presents this date s/p (Right) MARLEY. Past Medical History/Comorbidities:   Ms. Yves Kumari  has a past medical history of Arthralgia, Chronic kidney disease, CKD (chronic kidney disease), stage III, Essential hypertension, Hip pain, Hyperlipidemia, Mild aortic valve regurgitation, Murmur, Neuropathy, Overactive bladder, Primary osteoarthritis of both knees, Right bundle branch block, and Type 2 diabetes mellitus ([facility] [address] She also has no past medical history of CAD (coronary artery disease). Ms. Yves Kumari  has a past surgical history that includes hx fracture tx; hx nephrectomy (Right, 03/21/2018); hx cataract removal (Bilateral); hx other surgical (01/29/2019); and hx back surgery (1992).   Social History/Living Environment:   Home Environment: Private residence  Support Systems: Family member(s)  Patient Expects to be Discharged to[de-identified] Private residence  Prior Level of Function/Work/Activity:  Independent prior. Number of Personal Factors/Comorbidities that affect the Plan of Care: Brief history (0):  LOW COMPLEXITY   ASSESSMENT OF OCCUPATIONAL PERFORMANCE[de-identified]   Most Recent Physical Functioning:   Balance  Sitting: Intact  Standing: With support                    Coordination  Fine Motor Skills-Upper: Left Intact; Right Intact  Gross Motor Skills-Upper: Left Intact; Right Intact         Mental Status  Neurologic State: Alert  Orientation Level: Oriented X4                Basic ADLs (From Assessment) Complex ADLs (From Assessment)   Basic ADL  Feeding: Independent  Oral Facial Hygiene/Grooming: Minimum assistance  Upper Body Dressing: Setup  Lower Body Dressing: Moderate assistance  Toileting: Moderate assistance     Grooming/Bathing/Dressing Activities of Daily Living                       Functional Transfers  Bathroom Mobility: Minimum assistance  Toilet Transfer : Moderate assistance  Shower Transfer: Moderate assistance     Bed/Mat Mobility  Supine to Sit: Minimum assistance  Sit to Stand: Minimum assistance  Stand to Sit: Minimum assistance  Bed to Chair: Minimum assistance  Scooting: Minimum assistance         Physical Skills Involved:  Range of Motion  Balance  Strength Cognitive Skills Affected (resulting in the inability to perform in a timely and safe manner):  [facility] Psychosocial Skills Affected:  [place]   Number of elements that affect the Plan of Care: 1-3:  LOW COMPLEXITY   CLINICAL DECISION MAKIN [facility] [address] [address] AM-PAC 6 Clicks   Daily Activity Inpatient Short Form  How much help from another person does the patient currently need. .. Total A Lot A Little None   1. Putting on and taking off regular lower body clothing? [] 1   [x] 2   [] 3   [] 4   2. Bathing (including washing, rinsing, drying)? [] 1   [x] 2   [] 3   [] 4   3.   Toileting, which includes using toilet, bedpan or urinal?   [] 1 [x] 2   [] 3   [] 4   4. Putting on and taking off regular upper body clothing? [] 1   [] 2   [] 3   [] 4   5. Taking care of personal grooming such as brushing teeth? [] 1   [] 2   [] 3   [x] 4   6. Eating meals? [] 1   [] 2   [] 3   [x] 4   © 2007, Trustees of [address] [place] [address] [address] [address], under license to [employer]. All rights reserved     Score:  Initial: 18 Most Recent: X (Date: -- )    Interpretation of Tool:  Represents activities that are increasingly more difficult (i.e. Bed mobility, Transfers, Gait). Medical Necessity:     Skilled intervention continues to be required due to deficits listed above. Reason for Services/Other Comments:  Patient continues to require skilled intervention due to   New MARLEY  . Use of outcome tool(s) and clinical judgement create a POC that gives a: MODERATE COMPLEXITY            TREATMENT:   (In addition to Assessment/Re-Assessment sessions the following treatments were rendered)     Pre-treatment Symptoms/Complaints:    Pain: Initial:   Pain Intensity 1: 4  Post Session:  4     Self Care: (10): Procedure(s) (per grid) utilized to improve and/or restore self-care/home management as related to dressing and toileting. Required minimal verbal and tactile cueing to facilitate activities of daily living skills. Intiial evaluation 5 mintues    Treatment/Session Assessment:     Response to Treatment:  Good, sitting up in recliner. Education:  [] Home Exercises  [x] Fall Precautions  [x] Hip Precautions [] Going Home Video  [] Knee/Hip Prosthesis Review  [x] Walker Management/Safety [x] Adaptive Equipment as Needed       Interdisciplinary Collaboration:   Physical Therapist  Occupational Therapist  Registered Nurse    After treatment position/precautions:   Up in chair  Bed/Chair-wheels locked  Caregiver at bedside  Call light within reach  RN notified     Compliance with Program/Exercises: Compliant all of the time, Will assess as treatment progresses. Recommendations/Intent for next treatment session:  Treatment next visit will focus on increasing Ms. Calderon's independence with bed mobility, transfers, self care, functional mobility, modalities for pain, and patient education.       Total Treatment Duration:  OT Patient Time In/Time Out  Time In: 1645  Time Out: 4[address] [facility],

## 2020-12-07 NOTE — ANESTHESIA POSTPROCEDURE EVALUATION
Procedure(s):  RIGHT HIP ARTHROPLASTY TOTAL/ CEMENTED TOMMY EXETER.    spinal    Anesthesia Post Evaluation        Patient location during evaluation: PACU  Patient participation: complete - patient participated  Level of consciousness: awake  Pain management: satisfactory to patient  Airway patency: patent  Anesthetic complications: no  Cardiovascular status: hemodynamically stable  Respiratory status: spontaneous ventilation  Hydration status: euvolemic  Post anesthesia nausea and vomiting:  none      INITIAL Post-op Vital signs:   Vitals Value Taken Time   /70 12/7/2020  3:29 PM   Temp 37.4 °C (99.3 °F) 12/7/2020  3:14 PM   Pulse 61 12/7/2020  3:29 PM   Resp 18 12/7/2020  3:29 PM   SpO2 99 % 12/7/2020  3:29 PM

## 2020-12-07 NOTE — PERIOP NOTES
Betadine lavage:  17.5cc of betadine lot # [id number] , exp. Date  02/2022,  in 500cc of . 9NS Lot # [id number] , exp.  Date : [id number]

## 2020-12-07 NOTE — ANESTHESIA PREPROCEDURE EVALUATION
Relevant Problems   No relevant active problems       Anesthetic History   No history of anesthetic complications            Review of Systems / Medical History  Patient summary reviewed and pertinent labs reviewed    Pulmonary  Within defined limits                 Neuro/Psych   Within defined limits           Cardiovascular    Hypertension: well controlled              Exercise tolerance: >4 METS  Comments: Echo 2/2019 - left ventricular hypertrophy, EF 55-65%  Mild aortic valve regurgitation and sclerosis.    GI/Hepatic/Renal         Renal disease: CRI       Endo/Other    Diabetes: well controlled, type 2    Arthritis     Other Findings                   Anesthetic Plan    ASA: 3  Anesthesia type: spinal            Anesthetic plan and risks discussed with: Patient

## 2020-12-07 NOTE — PERIOP NOTES
Teach back method used with patient concerning hibiclens wash, TB screening, incentive spirometer, pain management goals, and discharge needs list.is goal 1500

## 2020-12-07 NOTE — PERIOP NOTES
TRANSFER - IN REPORT:    Verbal report received from [address] [place] [address] on [place]  being received from 3rd floor for routine progression of care      Report consisted of patients Situation, Background, Assessment and   Recommendations(SBAR). Information from the following report(s) Kardex was reviewed with the receiving nurse. Opportunity for questions and clarification was provided. Assessment completed upon patients arrival to unit and care assumed.

## 2020-12-07 NOTE — PROGRESS NOTES
Care Management Interventions  PCP Verified by CM: Yes  Mode of Transport at Discharge: Self  Transition of Care Consult (CM Consult): [address]: No  Reason Outside [place]: Out of service area  Discharge Durable Medical Equipment: Yes  Physical Therapy Consult: Yes  Occupational Therapy Consult: Yes  Current Support Network: Lives with Spouse  Confirm Follow Up Transport: Family  The Plan for Transition of Care is Related to the Following Treatment Goals : return to independent function  The Patient and/or Patient Representative was Provided with a Choice of Provider and Agrees with the Discharge Plan?: Yes  Freedom of Choice List was Provided with Basic Dialogue that Supports the Patient's Individualized Plan of Care/Goals, Treatment Preferences and Shares the Quality Data Associated with the Providers?: Yes  Discharge Location  Discharge Placement: Home with home health    Patient is a 80y.o. year old female admitted for Right MARLEY . Patient plans to return home on discharge. Order received to arrange home health. Patient without preference towards agency. Referral sent to [facility]. Patient  has a raised toilet seat. Patient requesting we arrange a walker. Referral sent to [address] delivered to the hospital room prior to discharge. Will follow until discharge.

## 2020-12-08 LAB
GLUCOSE BLD STRIP.AUTO-MCNC: 141 MG/DL (ref 65–100)
GLUCOSE BLD STRIP.AUTO-MCNC: 177 MG/DL (ref 65–100)
GLUCOSE BLD STRIP.AUTO-MCNC: 207 MG/DL (ref 65–100)
GLUCOSE BLD STRIP.AUTO-MCNC: 251 MG/DL (ref 65–100)
HGB BLD-MCNC: 13.1 G/DL (ref 11.7–15.4)

## 2020-12-08 PROCEDURE — 74011000250 HC RX REV CODE- 250: Performed by: PHYSICIAN ASSISTANT

## 2020-12-08 PROCEDURE — 65270000029 HC RM PRIVATE

## 2020-12-08 PROCEDURE — 74011250637 HC RX REV CODE- 250/637: Performed by: PHYSICIAN ASSISTANT

## 2020-12-08 PROCEDURE — 74011000250 HC RX REV CODE- 250: Performed by: ORTHOPAEDIC SURGERY

## 2020-12-08 PROCEDURE — 74011250637 HC RX REV CODE- 250/637: Performed by: ORTHOPAEDIC SURGERY

## 2020-12-08 PROCEDURE — 97535 SELF CARE MNGMENT TRAINING: CPT

## 2020-12-08 PROCEDURE — 82962 GLUCOSE BLOOD TEST: CPT

## 2020-12-08 PROCEDURE — 97110 THERAPEUTIC EXERCISES: CPT

## 2020-12-08 PROCEDURE — 2709999900 HC NON-CHARGEABLE SUPPLY

## 2020-12-08 PROCEDURE — 97116 GAIT TRAINING THERAPY: CPT

## 2020-12-08 PROCEDURE — 36415 COLL VENOUS BLD VENIPUNCTURE: CPT

## 2020-12-08 PROCEDURE — 74011636637 HC RX REV CODE- 636/637: Performed by: INTERNAL MEDICINE

## 2020-12-08 PROCEDURE — 74011250637 HC RX REV CODE- 250/637: Performed by: INTERNAL MEDICINE

## 2020-12-08 PROCEDURE — 74011250636 HC RX REV CODE- 250/636: Performed by: PHYSICIAN ASSISTANT

## 2020-12-08 PROCEDURE — 74011250636 HC RX REV CODE- 250/636: Performed by: ORTHOPAEDIC SURGERY

## 2020-12-08 PROCEDURE — 85018 HEMOGLOBIN: CPT

## 2020-12-08 RX ORDER — HYDROMORPHONE HYDROCHLORIDE 2 MG/1
2 TABLET ORAL
Status: DISCONTINUED | OUTPATIENT
Start: 2020-12-08 | End: 2020-12-09 | Stop reason: HOSPADM

## 2020-12-08 RX ORDER — POTASSIUM CHLORIDE AND SODIUM CHLORIDE 450; 150 MG/100ML; MG/100ML
INJECTION, SOLUTION INTRAVENOUS CONTINUOUS
Status: DISCONTINUED | OUTPATIENT
Start: 2020-12-08 | End: 2020-12-09 | Stop reason: HOSPADM

## 2020-12-08 RX ADMIN — Medication 81 MG: at 21:04

## 2020-12-08 RX ADMIN — HYDROMORPHONE HYDROCHLORIDE 1 MG: 1 INJECTION, SOLUTION INTRAMUSCULAR; INTRAVENOUS; SUBCUTANEOUS at 04:45

## 2020-12-08 RX ADMIN — Medication 1 AMPULE: at 21:04

## 2020-12-08 RX ADMIN — Medication 81 MG: at 08:17

## 2020-12-08 RX ADMIN — GABAPENTIN 100 MG: 100 CAPSULE ORAL at 16:28

## 2020-12-08 RX ADMIN — HYDROMORPHONE HYDROCHLORIDE 2 MG: 2 TABLET ORAL at 12:16

## 2020-12-08 RX ADMIN — GABAPENTIN 100 MG: 100 CAPSULE ORAL at 08:17

## 2020-12-08 RX ADMIN — INSULIN HUMAN 2 UNITS: 100 INJECTION, SOLUTION PARENTERAL at 12:17

## 2020-12-08 RX ADMIN — ACETAMINOPHEN 1000 MG: 500 TABLET, FILM COATED ORAL at 12:16

## 2020-12-08 RX ADMIN — HYDROMORPHONE HYDROCHLORIDE 2 MG: 2 TABLET ORAL at 08:17

## 2020-12-08 RX ADMIN — LISINOPRIL 5 MG: 5 TABLET ORAL at 08:17

## 2020-12-08 RX ADMIN — INSULIN HUMAN 6 UNITS: 100 INJECTION, SOLUTION PARENTERAL at 21:06

## 2020-12-08 RX ADMIN — PROMETHAZINE HYDROCHLORIDE 12.5 MG: 25 INJECTION INTRAMUSCULAR; INTRAVENOUS at 04:44

## 2020-12-08 RX ADMIN — CEFAZOLIN SODIUM 2 G: 100 INJECTION, POWDER, LYOPHILIZED, FOR SOLUTION INTRAVENOUS at 05:00

## 2020-12-08 RX ADMIN — HYDROMORPHONE HYDROCHLORIDE 1 MG: 1 INJECTION, SOLUTION INTRAMUSCULAR; INTRAVENOUS; SUBCUTANEOUS at 00:03

## 2020-12-08 RX ADMIN — POTASSIUM CHLORIDE AND SODIUM CHLORIDE: 450; 150 INJECTION, SOLUTION INTRAVENOUS at 10:08

## 2020-12-08 RX ADMIN — ACETAMINOPHEN 500 MG: 500 TABLET, FILM COATED ORAL at 16:28

## 2020-12-08 RX ADMIN — DOCUSATE SODIUM 50MG AND SENNOSIDES 8.6MG 2 TABLET: 8.6; 5 TABLET, FILM COATED ORAL at 08:17

## 2020-12-08 RX ADMIN — HYDROMORPHONE HYDROCHLORIDE 2 MG: 2 TABLET ORAL at 16:28

## 2020-12-08 RX ADMIN — GABAPENTIN 100 MG: 100 CAPSULE ORAL at 21:05

## 2020-12-08 RX ADMIN — HYDROMORPHONE HYDROCHLORIDE 2 MG: 2 TABLET ORAL at 21:05

## 2020-12-08 RX ADMIN — ACETAMINOPHEN 1000 MG: 500 TABLET, FILM COATED ORAL at 23:44

## 2020-12-08 RX ADMIN — Medication 5 ML: at 21:08

## 2020-12-08 RX ADMIN — OXYBUTYNIN CHLORIDE 5 MG: 5 TABLET, EXTENDED RELEASE ORAL at 08:17

## 2020-12-08 RX ADMIN — Medication 1 AMPULE: at 08:17

## 2020-12-08 RX ADMIN — HYDROCHLOROTHIAZIDE 12.5 MG: 12.5 CAPSULE ORAL at 08:17

## 2020-12-08 RX ADMIN — GABAPENTIN 100 MG: 100 CAPSULE ORAL at 12:16

## 2020-12-08 RX ADMIN — Medication 10 ML: at 14:00

## 2020-12-08 RX ADMIN — INSULIN HUMAN 4 UNITS: 100 INJECTION, SOLUTION PARENTERAL at 08:00

## 2020-12-08 RX ADMIN — ACETAMINOPHEN 1000 MG: 500 TABLET, FILM COATED ORAL at 05:00

## 2020-12-08 NOTE — PROGRESS NOTES
Pt. has been nauseous and vomiting throughout the shift and states that PO pain medicine is not working for her. Spoke to Dr. Kathy Chavez and received order to give promethazine 12.5 mg and IV dilaudid for pain management.

## 2020-12-08 NOTE — PROGRESS NOTES
Pt.'s pain is under control, she states she is comfortable and reports relief from nausea with phenergan. No other needs expressed at this time.

## 2020-12-08 NOTE — PROGRESS NOTES
12/07/20 2138   Oxygen Therapy   O2 Sat (%) 96 %   O2 Device Nasal cannula   O2 Flow Rate (L/min) 3 l/min     C/s placed on patient. Alarms set per protocol.

## 2020-12-08 NOTE — PROGRESS NOTES
Problem: Mobility Impaired (Adult and Pediatric)  Goal: *Acute Goals and Plan of Care (Insert Text)  Note: GOALS (1-4 days):  (1.)Ms. Nani Wheeler will move from supine to sit and sit to supine  in bed with INDEPENDENT. (2.)Ms. Nani Wheeler will transfer from bed to chair and chair to bed with INDEPENDENT using the least restrictive device. (3.)Ms. Nani Wheeler will ambulate with INDEPENDENT for 200 feet with the least restrictive device. (4.)Ms. Nani Wheeler will ambulate up/down 3 steps with bilateral  railing with MINIMAL ASSIST with no device. (5.)Ms. Nani Wheeler will state/observe MARLEY precautions with 0 verbal cues. ________________________________________________________________________________________________       PHYSICAL THERAPY JOINT CAMP MARLEY: Daily Note and PM 12/8/2020  INPATIENT: Hospital Day: 2  [place] / Product Type: Medicare /      NAME/AGE/GENDER: Seth Jacobo is a 80 y.o. female   PRIMARY DIAGNOSIS:  Primary osteoarthritis of right hip [M16.11]   Procedure(s) and Anesthesia Type:     * RIGHT HIP ARTHROPLASTY TOTAL/ CEMENTED TOMMY EXETER - Spinal (Right)  ICD-10: Treatment Diagnosis:    · Pain in Right Hip (M25.551)  · Stiffness of Right Hip, Not elsewhere classified (M25.651)  · Difficulty in walking, Not elsewhere classified (R26.2)  · Other abnormalities of gait and mobility (R26.89)      ASSESSMENT:     Ms. Nani Wheeler presents with decreased independence with functional mobility. Pt performed supine to sit to stand. Pt ambulated in room. Pt performed exercises in bedside chair. Pt in bedside chair with needs in reach. Pt's daughter present. 12/8 did well. Sitting in the chair upon contact. Performs TH exercises with guidance and verbal cues. Ambulates 80 ft using RW with SBA while working on normal gait pattern. Therapist and pt review hip precaution. Remain in the recliner with needs in reach. 12/8 pm making good progress with therapy.   Performs TH exercises with some guidance, but over all good technique. Increase distance this pm without LOB. Return to bed with needs in reach. Will continue to make progress. This section established at most recent assessment   PROBLEM LIST (Impairments causing functional limitations):  1. Decreased Strength  2. Decreased Transfer Abilities  3. Decreased Ambulation Ability/Technique  4. Decreased Balance  5. Increased Pain  6. Decreased Activity Tolerance  7. Decreased Flexibility/Joint Mobility  8. Decreased strength   INTERVENTIONS PLANNED: (Benefits and precautions of physical therapy have been discussed with the patient.)  1. Bed Mobility  2. Cold  3. Gait Training  4. Home Exercise Program (HEP)  5. Range of Motion (ROM)  6. Therapeutic Activites  7. Therapeutic Exercise/Strengthening  8. Transfer Training     TREATMENT PLAN: Frequency/Duration: Follow patient BID for duration of hospital stay to address above goals. Rehabilitation Potential For Stated Goals: Good     RECOMMENDED REHABILITATION/EQUIPMENT: (at time of discharge pending progress): Continue Skilled Therapy and Home Health: Physical Therapy. HISTORY:   History of Present Injury/Illness (Reason for Referral):  Pt is status post right total hip replacement. Past Medical History/Comorbidities:   Ms. Kevon Falcon  has a past medical history of Arthralgia, Chronic kidney disease, CKD (chronic kidney disease), stage III, Essential hypertension, Hip pain, Hyperlipidemia, Mild aortic valve regurgitation, Murmur, Neuropathy, Overactive bladder, Primary osteoarthritis of both knees, Right bundle branch block, and Type 2 diabetes mellitus ([facility] [address] She also has no past medical history of CAD (coronary artery disease). Ms. Kevon Falcon  has a past surgical history that includes hx fracture tx; hx nephrectomy (Right, 03/21/2018); hx cataract removal (Bilateral); hx other surgical (01/29/2019); and hx back surgery (1992).    Social History/Living Environment:   Home Environment: Private residence  # Steps to Enter: 2  Rails to Enter: Yes  Hand Rails : Right  One/Two Story Residence: One story (with basement-13 steps)  Living Alone: Yes  Support Systems: Family member(s)  Patient Expects to be Discharged to[de-identified] Private residence  Current DME Used/Available at Home: [employer], straight;Raised toilet seat; Shower chair  Tub or Shower Type: Shower    Prior Level of Function/Work/Activity:  Pt is independent with ambulation. Number of Personal Factors/Comorbidities that affect the Plan of Care: 0: LOW COMPLEXITY   EXAMINATION:   Most Recent Physical Functioning:                            Bed Mobility  Supine to Sit: Stand-by assistance  Sit to Supine: Stand-by assistance         Balance  Sitting: Intact  Standing: Intact; With support         Gait Training: Yes    Weight Bearing Status  Right Side Weight Bearing: As tolerated  Distance (ft): 86 Feet (ft)  Ambulation - Level of Assistance: Stand-by assistance  Assistive Device: Walker, rolling  Gait Abnormalities: Decreased step clearance        Braces/Orthotics: none    Right Hip Cold  Type: Cold/ice packs      Body Structures Involved:  1. Bones  2. Joints  3. Muscles  4. Ligaments Body Functions Affected:  1. Neuromusculoskeletal  2. Movement Related Activities and Participation Affected:  1. Mobility   Number of elements that affect the Plan of Care: 4+: HIGH COMPLEXITY   CLINICAL PRESENTATION:   Presentation: Stable and uncomplicated: LOW COMPLEXITY   CLINICAL DECISION MAKIN [facility] [address] [address] AM-PAC 6 Clicks   Basic Mobility Inpatient Short Form  How much difficulty does the patient currently have. .. Unable A Lot A Little None   1. Turning over in bed (including adjusting bedclothes, sheets and blankets)? [] 1   [] 2   [x] 3   [] 4   2. Sitting down on and standing up from a chair with arms ( e.g., wheelchair, bedside commode, etc.)   [] 1   [] 2   [x] 3   [] 4   3. Moving from lying on back to sitting on the side of the bed?    [] 1   [] 2 [x] 3   [] 4   How much help from another person does the patient currently need. .. Total A Lot A Little None   4. Moving to and from a bed to a chair (including a wheelchair)? [] 1   [] 2   [x] 3   [] 4   5. Need to walk in hospital room? [] 1   [] 2   [x] 3   [] 4   6. Climbing 3-5 steps with a railing? [] 1   [] 2   [x] 3   [] 4   © 2007, Trustees of [address] [place] [address] [address] [address], under license to [employer]. All rights reserved     Score:  Initial: 18 Most Recent: X (Date: -- )    Interpretation of Tool:  Represents activities that are increasingly more difficult (i.e. Bed mobility, Transfers, Gait). Medical Necessity:     · Skilled intervention continues to be required due to decreased mobility ability. Reason for Services/Other Comments:  · Patient continues to require skilled intervention due to   · Decreased mobility ability. · .   Use of outcome tool(s) and clinical judgement create a POC that gives a: Clear prediction of patient's progress: LOW COMPLEXITY            TREATMENT:   (In addition to Assessment/Re-Assessment sessions the following treatments were rendered)     Pre-treatment Symptoms/Complaints:  Doing better  Pain Initial:   Pain Intensity 1: 2(about the same after therapy)  Post Session:     Therapeutic Exercise: (15 Minutes):  Exercises per grid below to improve mobility. Gait Training (15 Minutes):  Gait training to improve and/or restore physical functioning as related to mobility. Ambulated 80 Feet (ft) with Stand-by assistance using a Walker, rolling and minimal   related to their hip position and motion to promote proper body alignment.     Date:  12/7 Date:  12/8   Date:     ACTIVITY/EXERCISE AM PM AM PM AM PM   GROUP THERAPY  []  []  []  []  []  []   Ankle Pumps  10 15 15     Quad Sets  10 15 15     Gluteal Sets  10 15 15     Hip ABd/ADduction  10 15 15     Straight Leg Raises         Knee Slides  10 15 15     Short Arc Quads   15 15     Long Arc Quads   15 15     Chair Slides B = bilateral; AA = active assistive; A = active; P = passive      Treatment/Session Assessment:     Response to Treatment:  Pt agreeable for exercises    Education:  [x] Home Exercises  [x] Fall Precautions  [x] Hip Precautions [] D/C Instruction Review  [] Hip Prosthesis Review  [x] Walker Management/Safety [] Adaptive Equipment as Needed       Interdisciplinary Collaboration:   o Physical Therapy Assistant  o Registered Nurse    After treatment position/precautions:   o Supine in bed  o Bed/Chair-wheels locked  o Bed in low position  o Caregiver at bedside  o Call light within reach  o RN notified    Compliance with Program/Exercises: Compliant most of the time, Will assess as treatment progresses. Recommendations/Intent for next treatment session:  Treatment next visit will focus on increasing Ms. Calderon's independence with bed mobility, transfers, gait training, strength/ROM exercises, modalities for pain, and patient education.       Total Treatment Duration:  PT Patient Time In/Time Out  Time In: 1345  Time Out: [address] [facility] [place], DARIO

## 2020-12-08 NOTE — PROGRESS NOTES
Problem: Self Care Deficits Care Plan (Adult)  Goal: *Acute Goals and Plan of Care (Insert Text)  Description: GOALS: met  DISCHARGE GOALS (in preparation for going home/rehab):  3 days  1. Ms. Tanisha Aquino will perform one lower body dressing activity with minimal assistance with adaptive equipment to demonstrate improved functional mobility and safety. 2.  Ms. Tanisha Aquino will perform one lower body bathing activity with minimal  assistance with adaptive equipment to demonstrate improved functional mobility and safety. 3.  Ms. Tanisha Aquino will perform toileting/toilet transfer with contact guard assistance with adaptive equipment to demonstrate improved functional mobility and safety. 4.  Ms. Tanisha Aquino will perform shower transfer with contact guard assistance with adaptive equipment to demonstrate improved functional mobility and safety. 5.  Ms. Tanisha Aquino will state MARLEY precautions with two verbal cues to demonstrate improved functional mobility and safety. JOINT CAMP OCCUPATIONAL THERAPY MARLEY: Daily Note, Discharge, Treatment Day: 1st and AM 12/8/2020  INPATIENT: Hospital Day: 2  [place] / [place] / Product Type: Medicare /      NAME/AGE/GENDER: Justin Christianson is a 80 y.o. female   PRIMARY DIAGNOSIS:  Primary osteoarthritis of right hip [M16.11]   Procedure(s) and Anesthesia Type:     * RIGHT HIP ARTHROPLASTY TOTAL/ CEMENTED TOMMY EXETER - Spinal (Right)  ICD-10: Treatment Diagnosis:    · Pain in Right Hip (M25.551)  · Stiffness of Right Hip, Not elsewhere classified (M25.651)      ASSESSMENT:     Ms. Tanisha Aquino is s/p Right MARLEY and presents with decreased weight bearing on R LE and decreased independence with functional mobility and activities of daily living as compared to prior level of function and safety. Patient would benefit from skilled Occupational Therapy to maximize independence and safety with self-care task and functional mobility.   Pt would also benefit from education on lower body adaptive equipment and hip precautions post-surgery in preparation for going home. Patient able to don gown at edge of bed. Mobilized from bed to recliner using a rolling walker. Should progress well with ADL's tomorrow. Patient completed shower and dressing as charted below in ADL grid, with daughter present and is ambulating with rolling walker and stand by assist.  Patient has met 5/5 goals and plans to return home with good family support. Family able to provide patient with appropriate level of assistance at this time. OT reviewed hip precautions throughout session. Patient instructed to call for assistance when needing to get up from recliner and all needs in reach. Patient verbalized understanding of call light. This section established at most recent assessment   PROBLEM LIST (Impairments causing functional limitations):  1. Decreased Strength  2. Decreased ADL/Functional Activities  3. Decreased Transfer Abilities  4. Increased Pain  5. Increased Fatigue  6. Decreased Flexibility/Joint Mobility  7. Decreased Knowledge of Precautions   INTERVENTIONS PLANNED: (Benefits and precautions of occupational therapy have been discussed with the patient.)  1. Activities of daily living training  2. Adaptive equipment training  3. Balance training  4. Clothing management  5. Donning&doffing training  6. Theraputic activity     TREATMENT PLAN: Frequency/Duration: Follow patient 1-2tx to address above goals. Rehabilitation Potential For Stated Goals: Good     RECOMMENDED REHABILITATION/EQUIPMENT: (at time of discharge pending progress): Continue Skilled Therapy. OCCUPATIONAL PROFILE AND HISTORY:   History of Present Injury/Illness (Reason for Referral): Pt presents this date s/p (Right) MARLEY.     Past Medical History/Comorbidities:   Ms. Sherif Phillips  has a past medical history of Arthralgia, Chronic kidney disease, CKD (chronic kidney disease), stage III, Essential hypertension, Hip pain, Hyperlipidemia, Mild aortic valve regurgitation, Murmur, Neuropathy, Overactive bladder, Primary osteoarthritis of both knees, Right bundle branch block, and Type 2 diabetes mellitus ([address] She also has no past medical history of CAD (coronary artery disease). Ms. John Birch  has a past surgical history that includes hx fracture tx; hx nephrectomy (Right, 03/21/2018); hx cataract removal (Bilateral); hx other surgical (01/29/2019); and hx back surgery (1992). Social History/Living Environment:   Home Environment: Private residence  Support Systems: Family member(s)  Patient Expects to be Discharged to[de-identified] Private residence  Prior Level of Function/Work/Activity:  Independent prior. Number of Personal Factors/Comorbidities that affect the Plan of Care: Brief history (0):  LOW COMPLEXITY   ASSESSMENT OF OCCUPATIONAL PERFORMANCE[de-identified]   Most Recent Physical Functioning:   Balance  Sitting: Intact  Standing: Intact; With support                              Mental Status  Neurologic State: Alert  Orientation Level: Oriented X4  Cognition: Appropriate decision making  Perception: Appears intact  Perseveration: No perseveration noted  Safety/Judgement: Awareness of environment                Basic ADLs (From Assessment) Complex ADLs (From Assessment)   Basic ADL  Feeding: Independent  Oral Facial Hygiene/Grooming: Minimum assistance  Type of Bath: Chlorhexidine (CHG), Full, Shower  Upper Body Dressing: Setup  Lower Body Dressing: Moderate assistance  Toileting: Moderate assistance     Grooming/Bathing/Dressing Activities of Daily Living   Grooming  Grooming Assistance: Supervision Cognitive Retraining  Safety/Judgement: Awareness of environment   Upper Body Bathing  Bathing Assistance: Stand-by assistance  Adaptive Equipment: Grab bar; Shower chair;Walker     Lower Body Bathing  Bathing Assistance: Stand-by assistance  Cues: Verbal cues provided  Adaptive Equipment: Grab bar; Shower chair;Walker Toileting  Toileting Assistance: Stand-by assistance  Cues: Verbal cues provided  Adaptive Equipment: Elevated seat;Grab bars; Walker   Upper Body Dressing Assistance  Dressing Assistance: Stand-by assistance Functional Transfers  Bathroom Mobility: Stand-by assistance  Toilet Transfer : Stand-by assistance  Shower Transfer: Stand-by assistance  Cues: Verbal cues provided  Adaptive Equipment: Grab bars; Shower chair with back; Walker (comment)   Lower Body Dressing Assistance  Dressing Assistance: Stand-by assistance  Cues: Verbal cues provided  Adaptive Equipment Used: Walker;Grab bar           Physical Skills Involved:  1. Range of Motion  2. Balance  3. Strength Cognitive Skills Affected (resulting in the inability to perform in a timely and safe manner):  1. [facility] Psychosocial Skills Affected:  1. [place]   Number of elements that affect the Plan of Care: 1-3:  LOW COMPLEXITY   CLINICAL DECISION MAKIN[address] [place] [address] AM-PAC 6 Clicks   Daily Activity Inpatient Short Form  How much help from another person does the patient currently need. .. Total A Lot A Little None   1. Putting on and taking off regular lower body clothing? [] 1   [] 2   [] 3   [x] 4   2. Bathing (including washing, rinsing, drying)? [] 1   [] 2   [] 3   [x] 4   3. Toileting, which includes using toilet, bedpan or urinal?   [] 1   [] 2   [] 3   [x] 4   4. Putting on and taking off regular upper body clothing? [] 1   [] 2   [] 3   [x] 4   5. Taking care of personal grooming such as brushing teeth? [] 1   [] 2   [] 3   [x] 4   6. Eating meals? [] 1   [] 2   [] 3   [x] 4   © , Trustees of [address] [place] [address], under license to [employer]. All rights reserved     Score:  Initial: 18 Most Recent: 24 (Date: 2020 )    Interpretation of Tool:  Represents activities that are increasingly more difficult (i.e. Bed mobility, Transfers, Gait). Medical Necessity:     · Skilled intervention continues to be required due to deficits listed above.   Reason for Services/Other Comments:  · Patient continues to require skilled intervention due to   · New MARLEY  · . Use of outcome tool(s) and clinical judgement create a POC that gives a: MODERATE COMPLEXITY            TREATMENT:   (In addition to Assessment/Re-Assessment sessions the following treatments were rendered)     Pre-treatment Symptoms/Complaints:    Pain: Initial:      Post Session:  4     Self Care: (40 min): Procedure(s) (per grid) utilized to improve and/or restore self-care/home management as related to dressing, bathing, toileting and grooming. Required minimal verbal and   cueing to facilitate activities of daily living skills and compensatory activities. Treatment/Session Assessment:     Response to Treatment:  Good, up to shower     Education:  [] Home Exercises  [x] Fall Precautions  [x] Hip Precautions [] Going Home Video  [] Knee/Hip Prosthesis Review  [x] Walker Management/Safety [x] Adaptive Equipment as Needed       Interdisciplinary Collaboration:   o Physical Therapist  o Occupational Therapist  o Registered Nurse    After treatment position/precautions:   o Up in chair  o Bed/Chair-wheels locked  o Call light within reach  o RN notified  o Family at bedside     Compliance with Program/Exercises: Compliant all of the time. Recommendations/Intent for next treatment session: Pt doing well all goals met and will do well at home with support from daughter. Patient will be discharged home with home health PT. No further Occupational Therapy warranted, will discharge Occupational Therapy services.       Total Treatment Duration:  OT Patient Time In/Time Out  Time In: 0905  Time Out: [place], OT

## 2020-12-08 NOTE — PROGRESS NOTES
Problem: Mobility Impaired (Adult and Pediatric)  Goal: *Acute Goals and Plan of Care (Insert Text)  Note: GOALS (1-4 days):  (1.)Ms. Claudetta Riding will move from supine to sit and sit to supine  in bed with INDEPENDENT. (2.)Ms. Claudetta Riding will transfer from bed to chair and chair to bed with INDEPENDENT using the least restrictive device. (3.)Ms. Claudetta Riding will ambulate with INDEPENDENT for 200 feet with the least restrictive device. (4.)Ms. Claudetta Riding will ambulate up/down 3 steps with bilateral  railing with MINIMAL ASSIST with no device. (5.)Ms. Claudetta Riding will state/observe MARLEY precautions with 0 verbal cues. ________________________________________________________________________________________________       PHYSICAL THERAPY JOINT CAMP MARLEY: Daily Note and AM 12/8/2020  INPATIENT: Hospital Day: 2  [place] / Product Type: Medicare /      NAME/AGE/GENDER: Richard Reis is a 80 y.o. female   PRIMARY DIAGNOSIS:  Primary osteoarthritis of right hip [M16.11]   Procedure(s) and Anesthesia Type:     * RIGHT HIP ARTHROPLASTY TOTAL/ CEMENTED TOMMY EXETER - Spinal (Right)  ICD-10: Treatment Diagnosis:    · Pain in Right Hip (M25.551)  · Stiffness of Right Hip, Not elsewhere classified (M25.651)  · Difficulty in walking, Not elsewhere classified (R26.2)  · Other abnormalities of gait and mobility (R26.89)      ASSESSMENT:     Ms. Claudetta Riding presents with decreased independence with functional mobility. Pt performed supine to sit to stand. Pt ambulated in room. Pt performed exercises in bedside chair. Pt in bedside chair with needs in reach. Pt's daughter present. 12/8 did well. Sitting in the chair upon contact. Performs TH exercises with guidance and verbal cues. Ambulates 80 ft using RW with SBA while working on normal gait pattern. Therapist and pt review hip precaution. Remain in the recliner with needs in reach.     This section established at most recent assessment   PROBLEM LIST (Impairments causing functional limitations):  1. Decreased Strength  2. Decreased Transfer Abilities  3. Decreased Ambulation Ability/Technique  4. Decreased Balance  5. Increased Pain  6. Decreased Activity Tolerance  7. Decreased Flexibility/Joint Mobility  8. Decreased strength   INTERVENTIONS PLANNED: (Benefits and precautions of physical therapy have been discussed with the patient.)  1. Bed Mobility  2. Cold  3. Gait Training  4. Home Exercise Program (HEP)  5. Range of Motion (ROM)  6. Therapeutic Activites  7. Therapeutic Exercise/Strengthening  8. Transfer Training     TREATMENT PLAN: Frequency/Duration: Follow patient BID for duration of hospital stay to address above goals. Rehabilitation Potential For Stated Goals: Good     RECOMMENDED REHABILITATION/EQUIPMENT: (at time of discharge pending progress): Continue Skilled Therapy and Home Health: Physical Therapy. HISTORY:   History of Present Injury/Illness (Reason for Referral):  Pt is status post right total hip replacement. Past Medical History/Comorbidities:   Ms. Karla Juárez  has a past medical history of Arthralgia, Chronic kidney disease, CKD (chronic kidney disease), stage III, Essential hypertension, Hip pain, Hyperlipidemia, Mild aortic valve regurgitation, Murmur, Neuropathy, Overactive bladder, Primary osteoarthritis of both knees, Right bundle branch block, and Type 2 diabetes mellitus ([facility] [address] She also has no past medical history of CAD (coronary artery disease). Ms. Karla Juárez  has a past surgical history that includes hx fracture tx; hx nephrectomy (Right, 03/21/2018); hx cataract removal (Bilateral); hx other surgical (01/29/2019); and hx back surgery (1992).    Social History/Living Environment:   Home Environment: Private residence  # Steps to Enter: 2  Rails to Enter: Yes  Hand Rails : Right  One/Two Story Residence: One story (with basement-13 steps)  Living Alone: Yes  Support Systems: Family member(s)  Patient Expects to be Discharged to[de-identified] Private residence  Current DME Used/Available at Home: [employer], straight;Raised toilet seat; Shower chair  Tub or Shower Type: Shower    Prior Level of Function/Work/Activity:  Pt is independent with ambulation. Number of Personal Factors/Comorbidities that affect the Plan of Care: 0: LOW COMPLEXITY   EXAMINATION:   Most Recent Physical Functioning:                                      Balance  Sitting: Intact  Standing: Intact; With support         Gait Training: Yes    Weight Bearing Status  Right Side Weight Bearing: As tolerated  Distance (ft): 80 Feet (ft)  Ambulation - Level of Assistance: Stand-by assistance;Contact guard assistance  Assistive Device: Walker, rolling  Gait Abnormalities: Decreased step clearance        Braces/Orthotics: none    Right Hip Cold  Type: Cold/ice packs      Body Structures Involved:  1. Bones  2. Joints  3. Muscles  4. Ligaments Body Functions Affected:  1. Neuromusculoskeletal  2. Movement Related Activities and Participation Affected:  1. Mobility   Number of elements that affect the Plan of Care: 4+: HIGH COMPLEXITY   CLINICAL PRESENTATION:   Presentation: Stable and uncomplicated: LOW COMPLEXITY   CLINICAL DECISION MAKIN[address] [place], [place] [id number] AM-PAC 6 Clicks   Basic Mobility Inpatient Short Form  How much difficulty does the patient currently have. .. Unable A Lot A Little None   1. Turning over in bed (including adjusting bedclothes, sheets and blankets)? [] 1   [] 2   [x] 3   [] 4   2. Sitting down on and standing up from a chair with arms ( e.g., wheelchair, bedside commode, etc.)   [] 1   [] 2   [x] 3   [] 4   3. Moving from lying on back to sitting on the side of the bed? [] 1   [] 2   [x] 3   [] 4   How much help from another person does the patient currently need. .. Total A Lot A Little None   4. Moving to and from a bed to a chair (including a wheelchair)? [] 1   [] 2   [x] 3   [] 4   5. Need to walk in hospital room?    [] 1   [] 2   [x] 3   [] 4   6.  Climbing 3-5 steps with a railing? [] 1   [] 2   [x] 3   [] 4   © 2007, Trustees of [address] [place] [address] [address] [address], under license to [employer]. All rights reserved     Score:  Initial: 18 Most Recent: X (Date: -- )    Interpretation of Tool:  Represents activities that are increasingly more difficult (i.e. Bed mobility, Transfers, Gait). Medical Necessity:     · Skilled intervention continues to be required due to decreased mobility ability. Reason for Services/Other Comments:  · Patient continues to require skilled intervention due to   · Decreased mobility ability. · .   Use of outcome tool(s) and clinical judgement create a POC that gives a: Clear prediction of patient's progress: LOW COMPLEXITY            TREATMENT:   (In addition to Assessment/Re-Assessment sessions the following treatments were rendered)     Pre-treatment Symptoms/Complaints:  Doing well. Pain Initial:   Pain Intensity 1: 2(about the same after therapy)  Post Session:  pain in right hip, improved with movement     Therapeutic Exercise: (15 Minutes):  Exercises per grid below to improve mobility. Gait Training (23 Minutes):  Gait training to improve and/or restore physical functioning as related to mobility. Ambulated 80 Feet (ft) with Stand-by assistance;Contact guard assistance using a Walker, rolling and minimal   related to their hip position and motion to promote proper body alignment.     Date:  12/7 Date:  12/8   Date:     ACTIVITY/EXERCISE AM PM AM PM AM PM   GROUP THERAPY  []  []  []  []  []  []   Ankle Pumps  10 15      Quad Sets  10 15      Gluteal Sets  10 15      Hip ABd/ADduction  10 15      Straight Leg Raises         Knee Slides  10 15      Short Arc Quads   15      Long Arc Quads   15      Chair Slides                  B = bilateral; AA = active assistive; A = active; P = passive      Treatment/Session Assessment:     Response to Treatment:  Pt agreeable for therapy    Education:  [x] Home Exercises  [x] Fall Precautions  [x] Hip Precautions [] D/C Instruction Review  [] Hip Prosthesis Review  [x] Walker Management/Safety [] Adaptive Equipment as Needed       Interdisciplinary Collaboration:   o Physical Therapy Assistant  o Registered Nurse    After treatment position/precautions:   o Up in chair  o Bed/Chair-wheels locked  o Bed in low position  o Caregiver at bedside  o Call light within reach  o RN notified    Compliance with Program/Exercises: Compliant most of the time, Will assess as treatment progresses. Recommendations/Intent for next treatment session:  Treatment next visit will focus on increasing Ms. Calderon's independence with bed mobility, transfers, gait training, strength/ROM exercises, modalities for pain, and patient education.       Total Treatment Duration:  PT Patient Time In/Time Out  Time In: 0815  Time Out: [address] Nick [address], PTA

## 2020-12-08 NOTE — PROGRESS NOTES
2020         Post Op day: 1 Day Post-OpProcedure(s) (LRB):  RIGHT HIP ARTHROPLASTY TOTAL/ CEMENTED TOMMY EXETER (Right)      Admit Date: 2020  Admit Diagnosis: Primary osteoarthritis of right hip [M16.11]  Osteoarthritis of right hip [M16.11]    LAB:    Recent Results (from the past 24 hour(s))   POTASSIUM    Collection Time: 20 11:37 AM   Result Value Ref Range    Potassium 5.1 3.5 - 5.1 mmol/L   GLUCOSE, POC    Collection Time: 20 11:40 AM   Result Value Ref Range    Glucose (POC) 100 65 - 100 mg/dL   GLUCOSE, POC    Collection Time: 20  3:54 PM   Result Value Ref Range    Glucose (POC) 115 (H) 65 - 100 mg/dL   HEMOGLOBIN    Collection Time: 20  6:38 PM   Result Value Ref Range    HGB 13.9 11.7 - 15.4 g/dL   GLUCOSE, POC    Collection Time: 20  8:19 PM   Result Value Ref Range    Glucose (POC) 248 (H) 65 - 100 mg/dL   HEMOGLOBIN    Collection Time: 20  4:28 AM   Result Value Ref Range    HGB 13.1 11.7 - 15.4 g/dL   GLUCOSE, POC    Collection Time: 20  6:19 AM   Result Value Ref Range    Glucose (POC) 207 (H) 65 - 100 mg/dL     Vital Signs:    Patient Vitals for the past 8 hrs:   BP Temp Pulse Resp SpO2   20 0335 (!) 143/58 97.9 °F (36.6 °C) 67 14 95 %   20 2346 (!) 150/59 96.8 °F (36 °C) 76 18 90 %     Temp (24hrs), Av.1 °F (36.7 °C), Min:96.8 °F (36 °C), Max:99.3 °F (37.4 °C)    Body mass index is 26.88 kg/m².   Pain Control:   Pain Assessment  Pain Scale 1: FLACC  Pain Intensity 1: 0  Pain Onset 1: (months)  Pain Location 1: Hip  Pain Orientation 1: Right  Pain Description 1: Radiating, Gnawing  Pain Intervention(s) 1: Medication (see MAR)    Subjective: Doing well, No complaints, No SOB, No Chest Pain,  nausea and vomiting     Objective: Vital Signs are Stable, No Acute Distress, Alert and Oriented, Dressing is dry,  Neurovascular exam is normal.       PT/OT:            Assistive Device: Fall prevention device, Walker (comment) Wieght Bearing Status: WBAT    Meds:  [unfilled]  [unfilled]  [unfilled]    Assessment:   Patient Active Problem List   Diagnosis Code    Osteoarthritis of right hip M16.11    Status post right hip replacement Z96.641    Diabetes mellitus type 2, controlled ([facility] [address] E11.9    Hypertension I10    Hyperlipidemia E78.5             Plan: Continue Physical Therapy, Monitor labs, home tomorrow        Signed By: Laurel Pierre MD

## 2020-12-09 VITALS
DIASTOLIC BLOOD PRESSURE: 64 MMHG | OXYGEN SATURATION: 96 % | HEIGHT: 69 IN | HEART RATE: 71 BPM | WEIGHT: 182 LBS | SYSTOLIC BLOOD PRESSURE: 124 MMHG | TEMPERATURE: 97.7 F | BODY MASS INDEX: 26.96 KG/M2 | RESPIRATION RATE: 14 BRPM

## 2020-12-09 LAB
GLUCOSE BLD STRIP.AUTO-MCNC: 104 MG/DL (ref 65–100)
HGB BLD-MCNC: 12.1 G/DL (ref 11.7–15.4)

## 2020-12-09 PROCEDURE — 74011250637 HC RX REV CODE- 250/637: Performed by: PHYSICIAN ASSISTANT

## 2020-12-09 PROCEDURE — 74011250637 HC RX REV CODE- 250/637: Performed by: INTERNAL MEDICINE

## 2020-12-09 PROCEDURE — 97110 THERAPEUTIC EXERCISES: CPT

## 2020-12-09 PROCEDURE — 82962 GLUCOSE BLOOD TEST: CPT

## 2020-12-09 PROCEDURE — 85018 HEMOGLOBIN: CPT

## 2020-12-09 PROCEDURE — 97116 GAIT TRAINING THERAPY: CPT

## 2020-12-09 PROCEDURE — 74011250637 HC RX REV CODE- 250/637: Performed by: ORTHOPAEDIC SURGERY

## 2020-12-09 PROCEDURE — 36415 COLL VENOUS BLD VENIPUNCTURE: CPT

## 2020-12-09 RX ORDER — ASPIRIN 81 MG/1
81 TABLET ORAL EVERY 12 HOURS
Qty: 70 TAB | Refills: 0 | Status: SHIPPED | OUTPATIENT
Start: 2020-12-09

## 2020-12-09 RX ORDER — HYDROMORPHONE HYDROCHLORIDE 2 MG/1
2 TABLET ORAL
Qty: 30 TAB | Refills: 0 | Status: SHIPPED | OUTPATIENT
Start: 2020-12-09 | End: 2020-12-12

## 2020-12-09 RX ADMIN — HYDROMORPHONE HYDROCHLORIDE 2 MG: 2 TABLET ORAL at 10:25

## 2020-12-09 RX ADMIN — Medication 81 MG: at 08:55

## 2020-12-09 RX ADMIN — HYDROMORPHONE HYDROCHLORIDE 2 MG: 2 TABLET ORAL at 03:30

## 2020-12-09 RX ADMIN — LISINOPRIL 5 MG: 5 TABLET ORAL at 08:55

## 2020-12-09 RX ADMIN — GABAPENTIN 100 MG: 100 CAPSULE ORAL at 08:55

## 2020-12-09 RX ADMIN — DOCUSATE SODIUM 50MG AND SENNOSIDES 8.6MG 2 TABLET: 8.6; 5 TABLET, FILM COATED ORAL at 08:56

## 2020-12-09 RX ADMIN — HYDROCHLOROTHIAZIDE 12.5 MG: 12.5 CAPSULE ORAL at 08:55

## 2020-12-09 RX ADMIN — OXYBUTYNIN CHLORIDE 5 MG: 5 TABLET, EXTENDED RELEASE ORAL at 08:56

## 2020-12-09 RX ADMIN — ACETAMINOPHEN 1000 MG: 500 TABLET, FILM COATED ORAL at 05:24

## 2020-12-09 RX ADMIN — Medication 1 AMPULE: at 08:56

## 2020-12-09 NOTE — DISCHARGE INSTRUCTIONS
[address] [facility]   Patient Discharge Instructions    Osmel Wallace / [id number] : 10/11/1930    Admitted 2020 Discharged: 2020     IF YOU HAVE ANY PROBLEMS ONCE YOU ARE AT HOME CALL THE FOLLOWING NUMBERS:   Main office number: [phone]      Medications    · The medications you are to continue on are listed on the medication reconciliation sheet. · Narcotic pain medications as well as supplemental iron can cause constipation. If this occurs try stopping the narcotic pain medication and/or the iron. · It is important that you take the medication exactly as they are prescribed. · Medications which increase your risk of blood clots are listed to stop for 5 weeks after surgery as well as medications or supplements which increase your risk of bleeding complications. · Keep your medication in the bottles provided by the pharmacist and keep a list of the medication names, dosages, and times to be taken in your wallet. · Do not take other medications without consulting your doctor. Important Information    Do NOT smoke as this will greatly increase your risk of infection! Resume your prehospital diet. If you have excessive nausea or vomitting call your doctor's office     Leg swelling and warmth is normal for 6 months after surgery. If you experience swelling in your leg elevate you leg while laying down with your toes above your heart. If you have sudden onset severe swelling with leg pain call our office. The stitches deep inside take approximately 6 months to dissolve. There will be sharp shooting, stinging and burning pain. This is normal and will resolve between 3-6 months after surgery. 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 You may try melatonin, an over-the-counter sleep aid or benadryl to help with sleep. Most patients will resume sleeping through the night 8 weeks after surgery. Home Physical Therapy is arranged.  Home [facility] will contact you within 48 hrs of discharge that you have chosen. If you have not received a call within this time frame please contact that provider you chose. You should be given this information before you leave the hospital.     You are at a risk for falls. Use the rolling walker when walking. Patients who have had a joint replacement should not drive if they are still taking narcotic pain mediation during the daytime hours. Most patients wean themselves off of pain medication within 2-5 weeks after surgery. When to Call the office    - If you have a temperature greater then 101  - Uncontrolled vomiting   - Loose control of your bladder or bowel function  - Are unable to bear any weight   - Need a pain medication refill       DISCHARGE SUMMARY from Nurse    The following personal items collected during your admission are returned to you:   Dental Appliance: Dental Appliances: Lowers, At home  Vision: Visual Aid: None  Hearing Aid:    Jewelry:    Clothing: Clothing: Pants, Shirt  Other Valuables: Other Valuables: Cell Phone  Valuables sent to safe:      PATIENT INSTRUCTIONS:    After general anesthesia or intravenous sedation, for 24 hours or while taking prescription Narcotics:  · Limit your activities  · Do not drive and operate hazardous machinery  · Do not make important personal or business decisions  · Do  not drink alcoholic beverages  · If you have not urinated within 8 hours after discharge, please contact your surgeon on call. Report the following to your surgeon:  · Excessive pain, swelling, redness or odor of or around the surgical area  · Temperature over 101  · Nausea and vomiting lasting longer than 4 hours or if unable to take medications  · Any signs of decreased circulation or nerve impairment to extremity: change in color, persistent  numbness, tingling, coldness or increase pain  · Any questions, call office @ [phone]      Keep scheduled follow up appointment.   If need to change, call office @ [phone]. *  Please give a list of your current medications to your Primary Care Provider. *  Please update this list whenever your medications are discontinued, doses are      changed, or new medications (including over-the-counter products) are added. *  Please carry medication information at all times in case of emergency situations. Patient Education        Hip Replacement Surgery (Posterior): What to Expect at Home  Your Recovery  Hip replacement surgery replaces the worn parts of your hip joint. When you leave the hospital, you will probably be walking with crutches or a walker. You may be able to climb a few stairs and get in and out of bed and chairs. But you will need someone to help you at home for the next few weeks or until you have more energy and can move around better. If you need more extensive rehab, you may go to a specialized rehab center for more treatment. You will go home with a bandage and stitches, staples, tissue glue, or tape strips. You can remove the bandage when your doctor tells you to. If you have stitches or staples, your doctor will remove them 10 days to 3 weeks after your surgery. Glue or tape strips will fall off on their own over time. You may still have some mild pain, and the area may be swollen for 3 to 4 months after surgery. Your doctor will give you medicine for the pain. You will continue the rehabilitation program (rehab) you started in the hospital. The better you do with your rehab exercises, the sooner you will get your strength and movement back. Most people are able to return to work 4 weeks to 4 months after surgery. This care sheet gives you a general idea about how long it will take for you to recover. But each person recovers at a different pace. Follow the steps below to get better as quickly as possible. How can you care for yourself at home?   Activity    · Your doctor may not want your affected leg to cross the center of your body toward the other leg. If so, your therapist may suggest these ideas:  ? Do not cross your legs. ? Be very careful as you get in or out of bed or a car, so your leg does not cross that imaginary line in the middle of your body.     · Go slowly when you climb stairs. Make sure the lights are on. Have someone watch you, if you can. When you climb stairs:  ? Step up first with your unaffected leg. Then bring the affected leg up to the same step. Bring your crutches or cane up. ? To go down stairs, reverse the order. First, put your crutches or cane on the lower step. Then bring the affected leg down to that step. Finally, step down with the unaffected leg.     · You can ride in a car, but stop at least once every hour to get out and walk around.     · You may want to sleep on your back. Don't reach down too far to pull up blankets when you lie in bed.     · If your doctor recommends exercises, do them as directed. You can cut back on your exercises if your muscles start to ache, but don't stop doing them.     · Rest when you feel tired. You may take a nap, but don't stay in bed all day.     · Work with your physical therapist to learn the best way to exercise. You will probably have to use crutches or a walker for at least 4 to 6 weeks.     · Your doctor may advise you to stay away from activities that put stress on the joint. This includes sports such as tennis, football, and jogging.     · Try not to sit for too long at one time. You will feel less stiff if you take a short walk about every hour. When you sit, use chairs with arms, and don't sit in low chairs.     · Do not bend over more than 90 degrees (like the angle in a letter \"L\").     · Sleep on your back with your legs slightly apart or on your side with a pillow between your knees for about 6 weeks or as your doctor tells you.  Do not sleep on your stomach or affected leg.     · Ask your doctor when you can drive again.     · Most people are able to return to work 4 weeks to 4 months after surgery.     · Ask your doctor when it is okay for you to have sex. Diet    · By the time you leave the hospital, you will probably be eating your normal diet. If your stomach is upset, try bland, low-fat foods like plain rice, broiled chicken, toast, and yogurt. Your doctor may recommend that you take iron and vitamin supplements.     · Drink plenty of fluids (unless your doctor tells you not to).   · Eat healthy foods, and watch your portion sizes. Try to stay at your ideal weight. Too much weight puts more stress on your new hip joint.     · You may notice that your bowel movements are not regular right after your surgery. This is common. Try to avoid constipation and straining with bowel movements. You may want to take a fiber supplement every day. If you have not had a bowel movement after a couple of days, ask your doctor about taking a mild laxative. Medicines    · Your doctor will tell you if and when you can restart your medicines. He or she will also give you instructions about taking any new medicines.     · If you take aspirin or some other blood thinner, ask your doctor if and when to start taking it again. Make sure that you understand exactly what your doctor wants you to do.     · Your doctor may give you a blood-thinning medicine to prevent blood clots. If you take a blood thinner, be sure you get instructions about how to take your medicine safely. Blood thinners can cause serious bleeding problems. This medicine could be in pill form or as a shot (injection). If a shot is necessary, your doctor will tell you how to do this.     · Be safe with medicines. Take pain medicines exactly as directed. ? If the doctor gave you a prescription medicine for pain, take it as prescribed.   ? If you are not taking a prescription pain medicine, ask your doctor if you can take an over-the-counter medicine.     · If you think your pain medicine is making you sick to your stomach:  ? Take your medicine after meals (unless your doctor has told you not to). ? Ask your doctor for a different pain medicine.     · If your doctor prescribed antibiotics, take them as directed. Do not stop taking them just because you feel better. You need to take the full course of antibiotics. Incision care    · If your doctor told you how to care for your cut (incision), follow your doctor's instructions. You will have a dressing over the cut. A dressing helps the incision heal and protects it. Your doctor will tell you how to take care of this.     · If you did not get instructions, follow this general advice:  ? If you have strips of tape on the cut the doctor made, leave the tape on for a week or until it falls off.  ? If you have stitches or staples, your doctor will tell you when to come back to have them removed. ? If you have skin adhesive on the cut, leave it on until it falls off. Skin adhesive is also called glue or liquid stitches. ? Change the bandage every day. ? Wash the area daily with warm water, and pat it dry. Don't use hydrogen peroxide or alcohol. They can slow healing. ? You may cover the area with a gauze bandage if it oozes fluid or rubs against clothing. ? You may shower 24 to 48 hours after surgery. Pat the incision dry. Don't swim or take a bath for the first 2 weeks, or until your doctor tells you it is okay. Exercise    · Your rehab program will include a number of exercises to do. Always do them as your therapist tells you. Ice and elevation    · For pain, put ice or a cold pack on the area for 10 to 20 minutes at a time. Put a thin cloth between the ice and your skin.     · Your ankle may swell for about 3 months. Prop up your ankle when you ice it or anytime you sit or lie down. Try to keep it above the level of your heart. This will help reduce swelling. Other instructions    · Continue to wear your support stockings as your doctor says.  These help to prevent blood clots. How long you'll have to wear them depends on your activity level and the amount of swelling you have. Most people wear these stockings for 4 to 6 weeks after surgery.     · Try to prevent falls. To avoid falling:  ? Arrange furniture so that you will not trip on it. ? Get rid of throw rugs, and move electrical cords out of the way. ? Walk only in areas with plenty of light. ? Put grab bars in showers and bathtubs. ? Avoid icy or snowy sidewalks. ? Wear shoes with sturdy, flat soles. Follow-up care is a key part of your treatment and safety. Be sure to make and go to all appointments, and call your doctor if you are having problems. It's also a good idea to know your test results and keep a list of the medicines you take. When should you call for help? Call 911 anytime you think you may need emergency care. For example, call if:    · You passed out (lost consciousness).     · You have severe trouble breathing.     · You have sudden chest pain and shortness of breath, or you cough up blood. Call your doctor now or seek immediate medical care if:    · You have signs that your hip may be dislocated, including:  ? Severe pain and not being able to stand. ? A crooked leg that looks like your hip is out of position. ? Not being able to bend or straighten your leg.     · Your leg or foot is cool or pale or changes color.     · You cannot feel or move your leg.     · You have signs of a blood clot, such as:  ? Pain in your calf, back of the knee, thigh, or groin. ? Redness and swelling in your leg or groin.     · Your incision comes open and begins to bleed, or the bleeding increases.     · You feel like your heart is racing or beating irregularly.     · You have signs of infection, such as:  ? Increased pain, swelling, warmth, or redness. ? Red streaks leading from the incision. ? Pus draining from the incision. ? A fever.    Watch closely for changes in your health, and be sure to contact your doctor if:    · You do not have a bowel movement after taking a laxative.     · You do not get better as expected. Where can you learn more? Go to http://www.gray.com/  Enter Q746 in the search box to learn more about \"Hip Replacement Surgery (Posterior): What to Expect at Home. \"  Current as of: March 2, 2020               Content Version: 12.6  © 2006-2020 [employer]. Care instructions adapted under license by [employer] (which disclaims liability or warranty for this information). If you have questions about a medical condition or this instruction, always ask your healthcare professional. [address] any warranty or liability for your use of this information. These are general instructions for a healthy lifestyle:    No smoking/ No tobacco products/ Avoid exposure to second hand smoke    Surgeon General's Warning:  Quitting smoking now greatly reduces serious risk to your health. Obesity, smoking, and sedentary lifestyle greatly increases your risk for illness    A healthy diet, regular physical exercise & weight monitoring are important for maintaining a healthy lifestyle    You may be retaining fluid if you have a history of heart failure or if you experience any of the following symptoms:  Weight gain of 3 pounds or more overnight or 5 pounds in a week, increased swelling in our hands or feet or shortness of breath while lying flat in bed. Please call your doctor as soon as you notice any of these symptoms; do not wait until your next office visit. Recognize signs and symptoms of STROKE:    F-face looks uneven    A-arms unable to move or move even    S-speech slurred or non-existent    T-time-call 911 as soon as signs and symptoms begin-DO NOT go       Back to bed or wait to see if you get better-TIME IS BRAIN. The discharge information has been reviewed with the patient.   The patient verbalized understanding. Information obtained by :  I understand that if any problems occur once I am at home I am to contact my physician. I understand and acknowledge receipt of the instructions indicated above.                                                                                                                                            Physician's or R.N.'s Signature                                                                  Date/Time                                                                                                                                              Patient or Representative Signature                                                          Date/Time

## 2020-12-09 NOTE — PROGRESS NOTES
Problem: Mobility Impaired (Adult and Pediatric)  Goal: *Acute Goals and Plan of Care (Insert Text)  Note: GOALS (1-4 days):  (1.)Ms. Jacqueline Chen will move from supine to sit and sit to supine  in bed with INDEPENDENT. (2.)Ms. Jacqueline Chen will transfer from bed to chair and chair to bed with INDEPENDENT using the least restrictive device. (3.)Ms. Jacqueline Chen will ambulate with INDEPENDENT for 200 feet with the least restrictive device. (4.)Ms. Jacqueline Chen will ambulate up/down 3 steps with bilateral  railing with MINIMAL ASSIST with no device. 12/9  (5.)Ms. Jacqueline Chen will state/observe MARLEY precautions with 0 verbal cues. 12/9________________________________________________________________________________________________       PHYSICAL THERAPY JOINT CAMP MARLEY: Daily Note and PM 12/9/2020  INPATIENT: Hospital Day: 3  [place] / [place] / Product Type: Medicare /      NAME/AGE/GENDER: Syl Vinson is a 80 y.o. female   PRIMARY DIAGNOSIS:  Primary osteoarthritis of right hip [M16.11]   Procedure(s) and Anesthesia Type:     * RIGHT HIP ARTHROPLASTY TOTAL/ CEMENTED TOMMY EXETER - Spinal (Right)  ICD-10: Treatment Diagnosis:    · Pain in Right Hip (M25.551)  · Stiffness of Right Hip, Not elsewhere classified (M25.651)  · Difficulty in walking, Not elsewhere classified (R26.2)  · Other abnormalities of gait and mobility (R26.89)      ASSESSMENT:     Ms. Jacqueline Chen presents with decreased independence with functional mobility. Pt performed supine to sit to stand. Pt ambulated in room. Pt performed exercises in bedside chair. Pt in bedside chair with needs in reach. Pt's daughter present. 12/8 did well. Sitting in the chair upon contact. Performs TH exercises with guidance and verbal cues. Ambulates 80 ft using RW with SBA while working on normal gait pattern. Therapist and pt review hip precaution. Remain in the recliner with needs in reach. 12/8 pm making good progress with therapy.   Performs TH exercises with some guidance, but over all good technique. Increase distance this pm without LOB. Return to bed with needs in reach. Will continue to make progress. 12/9   Participated well. Performs TH exercises without increase in pain. Walk 202 ft to the gym using RW with SBA while working on normal gait pattern. While in the gym therapist instructed/pt demonstrated going up/down steps with verbal cues. Then walk another 202 ft without LOB. Return to recliner with needs in reach. All question answer and ready for D/C. This section established at most recent assessment   PROBLEM LIST (Impairments causing functional limitations):  1. Decreased Strength  2. Decreased Transfer Abilities  3. Decreased Ambulation Ability/Technique  4. Decreased Balance  5. Increased Pain  6. Decreased Activity Tolerance  7. Decreased Flexibility/Joint Mobility  8. Decreased strength   INTERVENTIONS PLANNED: (Benefits and precautions of physical therapy have been discussed with the patient.)  1. Bed Mobility  2. Cold  3. Gait Training  4. Home Exercise Program (HEP)  5. Range of Motion (ROM)  6. Therapeutic Activites  7. Therapeutic Exercise/Strengthening  8. Transfer Training     TREATMENT PLAN: Frequency/Duration: Follow patient BID for duration of hospital stay to address above goals. Rehabilitation Potential For Stated Goals: Good     RECOMMENDED REHABILITATION/EQUIPMENT: (at time of discharge pending progress): Continue Skilled Therapy and Home Health: Physical Therapy. HISTORY:   History of Present Injury/Illness (Reason for Referral):  Pt is status post right total hip replacement.   Past Medical History/Comorbidities:   Ms. Harlan Stanley  has a past medical history of Arthralgia, Chronic kidney disease, CKD (chronic kidney disease), stage III, Essential hypertension, Hip pain, Hyperlipidemia, Mild aortic valve regurgitation, Murmur, Neuropathy, Overactive bladder, Primary osteoarthritis of both knees, Right bundle branch block, and Type 2 diabetes mellitus ([facility] [address] She also has no past medical history of CAD (coronary artery disease). Ms. Akira Thomas  has a past surgical history that includes hx fracture tx; hx nephrectomy (Right, 03/21/2018); hx cataract removal (Bilateral); hx other surgical (01/29/2019); and hx back surgery (1992). Social History/Living Environment:   Home Environment: Private residence  # Steps to Enter: 2  Rails to Enter: Yes  Hand Rails : Right  One/Two Story Residence: One story (with basement-13 steps)  Living Alone: Yes  Support Systems: Family member(s)  Patient Expects to be Discharged to[de-identified] Private residence  Current DME Used/Available at Home: [place], straight;Raised toilet seat; Shower chair  Tub or Shower Type: Shower    Prior Level of Function/Work/Activity:  Pt is independent with ambulation. Number of Personal Factors/Comorbidities that affect the Plan of Care: 0: LOW COMPLEXITY   EXAMINATION:   Most Recent Physical Functioning:                                 Transfers  Sit to Stand: Stand-by assistance  Stand to Sit: Stand-by assistance    Balance  Sitting: Intact  Standing: Intact; With support              Weight Bearing Status  Right Side Weight Bearing: As tolerated  Distance (ft): 404 Feet (ft)  Ambulation - Level of Assistance: Stand-by assistance  Assistive Device: Walker, rolling  Gait Abnormalities: Decreased step clearance  Number of Stairs Trained: 6  Stairs - Level of Assistance: Stand-by assistance  Rail Use: Both     Braces/Orthotics: none    Right Hip Cold  Type: Cold/ice packs      Body Structures Involved:  1. Bones  2. Joints  3. Muscles  4. Ligaments Body Functions Affected:  1. Neuromusculoskeletal  2. Movement Related Activities and Participation Affected:  1.  Mobility   Number of elements that affect the Plan of Care: 4+: HIGH COMPLEXITY   CLINICAL PRESENTATION:   Presentation: Stable and uncomplicated: LOW COMPLEXITY   CLINICAL DECISION MAKING:   [facility] AM-PAC 7 Clicks   Basic Mobility Inpatient Short Form  How much difficulty does the patient currently have. .. Unable A Lot A Little None   1. Turning over in bed (including adjusting bedclothes, sheets and blankets)? [] 1   [] 2   [x] 3   [] 4   2. Sitting down on and standing up from a chair with arms ( e.g., wheelchair, bedside commode, etc.)   [] 1   [] 2   [x] 3   [] 4   3. Moving from lying on back to sitting on the side of the bed? [] 1   [] 2   [x] 3   [] 4   How much help from another person does the patient currently need. .. Total A Lot A Little None   4. Moving to and from a bed to a chair (including a wheelchair)? [] 1   [] 2   [x] 3   [] 4   5. Need to walk in hospital room? [] 1   [] 2   [x] 3   [] 4   6. Climbing 3-5 steps with a railing? [] 1   [] 2   [x] 3   [] 4   © 2007, Trustees of [address] [place] [address], under license to [employer]. All rights reserved     Score:  Initial: 18 Most Recent: X (Date: -- )    Interpretation of Tool:  Represents activities that are increasingly more difficult (i.e. Bed mobility, Transfers, Gait). Medical Necessity:     · Skilled intervention continues to be required due to decreased mobility ability. Reason for Services/Other Comments:  · Patient continues to require skilled intervention due to   · Decreased mobility ability. · .   Use of outcome tool(s) and clinical judgement create a POC that gives a: Clear prediction of patient's progress: LOW COMPLEXITY            TREATMENT:   (In addition to Assessment/Re-Assessment sessions the following treatments were rendered)     Pre-treatment Symptoms/Complaints:  Ready for D/C. Pain Initial:   Pain Intensity 1: 2(about the same after therapy)  Post Session:     Therapeutic Exercise: (15 Minutes):  Exercises per grid below to improve mobility. Gait Training (23 Minutes):  Gait training to improve and/or restore physical functioning as related to mobility.   Ambulated 404 Feet (ft) with Stand-by assistance using a Walker, rolling and minimal   related to their hip position and motion to promote proper body alignment. Date:  12/7 Date:  12/8   Date:  12/9   ACTIVITY/EXERCISE AM PM AM PM AM PM   GROUP THERAPY  []  []  []  []  []  []   Ankle Pumps  10 15 15 15    Quad Sets  10 15 15 15    Gluteal Sets  10 15 15 15    Hip ABd/ADduction  10 15 15 15    Straight Leg Raises         Knee Slides  10 15 15 15    Short Arc Quads   15 15 15    Long Arc Quads   15 15 15    Chair Slides                  B = bilateral; AA = active assistive; A = active; P = passive      Treatment/Session Assessment:     Response to Treatment:  Pt agreeable for exercises and gait , ready for D/C. Education:  [x] Home Exercises  [x] Fall Precautions  [x] Hip Precautions [] D/C Instruction Review  [] Hip Prosthesis Review  [x] Walker Management/Safety [] Adaptive Equipment as Needed       Interdisciplinary Collaboration:   o Physical Therapy Assistant  o Registered Nurse    After treatment position/precautions:   o Up in chair  o Bed/Chair-wheels locked  o Bed in low position  o Caregiver at bedside  o Call light within reach  o RN notified    Compliance with Program/Exercises: Compliant most of the time, Will assess as treatment progresses. Recommendations/Intent for next treatment session:  Treatment next visit will focus on increasing Ms. Calderon's independence with bed mobility, transfers, gait training, strength/ROM exercises, modalities for pain, and patient education.       Total Treatment Duration:  PT Patient Time In/Time Out  Time In: 1000  Time Out: 600 [address] [facility]

## 2020-12-09 NOTE — PROGRESS NOTES
2020         Post Op day: 2 Days Post-OpProcedure(s) (LRB):  RIGHT HIP ARTHROPLASTY TOTAL/ CEMENTED TOMMY EXETER (Right)      Admit Date: 2020  Admit Diagnosis: Primary osteoarthritis of right hip [M16.11]  Osteoarthritis of right hip [M16.11]    LAB:    Recent Results (from the past 24 hour(s))   GLUCOSE, POC    Collection Time: 20 12:07 PM   Result Value Ref Range    Glucose (POC) 177 (H) 65 - 100 mg/dL   GLUCOSE, POC    Collection Time: 20  5:29 PM   Result Value Ref Range    Glucose (POC) 141 (H) 65 - 100 mg/dL   GLUCOSE, POC    Collection Time: 20  8:07 PM   Result Value Ref Range    Glucose (POC) 251 (H) 65 - 100 mg/dL   HEMOGLOBIN    Collection Time: 20  5:05 AM   Result Value Ref Range    HGB 12.1 11.7 - 15.4 g/dL   GLUCOSE, POC    Collection Time: 20  6:17 AM   Result Value Ref Range    Glucose (POC) 104 (H) 65 - 100 mg/dL     Vital Signs:    Patient Vitals for the past 8 hrs:   BP Temp Pulse Resp SpO2   20 0336 132/67 97.7 °F (36.5 °C) 70 14 93 %   20 2355 (!) 156/51 98 °F (36.7 °C) 66 16 90 %     Temp (24hrs), Av °F (36.7 °C), Min:97.7 °F (36.5 °C), Max:98.2 °F (36.8 °C)    Body mass index is 26.88 kg/m².   Pain Control:   Pain Assessment  Pain Scale 1: Visual  Pain Intensity 1: 0  Pain Onset 1: (months)  Pain Location 1: Hip  Pain Orientation 1: Right  Pain Description 1: Radiating, Gnawing  Pain Intervention(s) 1: Medication (see MAR)    Subjective: Doing well, No complaints, No SOB, No Chest Pain, No nausea or vomiting     Objective: Vital Signs are Stable, No Acute Distress, Alert and Oriented, Dressing is dry,  Neurovascular exam is normal.       PT/OT:            Assistive Device: Fall prevention device, Walker (comment)                Wieght Bearing Status: WBAT    Meds:  [unfilled]  [unfilled]  [unfilled]    Assessment:   Patient Active Problem List   Diagnosis Code    Osteoarthritis of right hip M16.11    Status post right hip replacement Z96.641    Diabetes mellitus type 2, controlled ([facility] [address] E11.9    Hypertension I10    Hyperlipidemia E78.5             Plan: Continue Physical Therapy, Monitor labs, home today        Signed By: Mj Oliva MD

## 2020-12-09 NOTE — PROGRESS NOTES
Pt discharge summary and home medication sheet reviewed and signed by pt. Copy given for take home use. Rx for po asa and Dilaudid given. All goals met. Assessment unchanged. Pt leaving hospital via w/c with daughter and staff member.

## 2020-12-11 NOTE — DISCHARGE SUMMARY
[facility]  Total Joint Discharge Summary      Patient ID:  Wero Ventura  [id number]  89 y.o.  10/11/1930    Admit date: 12/7/2020  Discharge date and time: 12/9/2020   Admitting Physician: Alicia Lemon MD  Surgeon: Same  Admission Diagnoses: Pre op diagnosis: Primary osteoarthritis of right hip [M16.11]. Prior to surgery the patient was seen for consultation in the office and a complete history and physical was taken as it pertained to their condition. The patient has tried conservative treatment for this condition including antiinflammatories and lifestyle modifications. They failed conservative treatment . They underwent Procedure(s) (LRB):  RIGHT HIP ARTHROPLASTY TOTAL/ CEMENTED TOMMY EXETER (Right) for this. Principle Problem: Status post right hip replacement. Discharge Diagnoses: Chronic and Acute problems addressed during this hospital stay include: Principal Problem:    Status post right hip replacement (12/7/2020)    Active Problems:    Osteoarthritis of right hip (12/7/2020)      Diabetes mellitus type 2, controlled ([address] (12/7/2020)      Hypertension (12/7/2020)      Hyperlipidemia (12/7/2020)                                Perioperative Antibiotics: Just prior to surgery Ancef 1 to 2 mg was given depending on patients Weight and allergies. If allergic to Ancef or due to other indications, patient was given Vancomycin      Hospital Medications:   No current facility-administered medications for this encounter. Current Outpatient Medications   Medication Sig    HYDROmorphone (DILAUDID) 2 mg tablet Take 1 Tab by mouth every four (4) hours as needed for Pain for up to 3 days. Max Daily Amount: 12 mg.  aspirin delayed-release 81 mg tablet Take 1 Tab by mouth every twelve (12) hours every twelve (12) hours.  cyanocobalamin (Vitamin B-12) 100 mcg tablet Take  by mouth daily.  Indications: prevention of vitamin B12 deficiency    atorvastatin (LIPITOR) 10 mg tablet Take 10 mg by mouth daily. Takes at noon; Take / use AM day of surgery  per anesthesia protocols. Indications: excessive fat in the blood    benazepriL (LOTENSIN) 5 mg tablet Take 5 mg by mouth daily. Indications: high blood pressure    cholecalciferol (VITAMIN D3) (5000 Units/125 mcg) tab tablet Take 1,000 Units by mouth daily. Indications: prevention of vitamin D deficiency    gabapentin (NEURONTIN) 100 mg capsule Take 100 mg by mouth three (3) times daily. Takes 1 at 1800; 1 at 2000; 2 at 2200 PM  Indications: neuropathic pain    glipiZIDE SR (GLUCOTROL XL) 5 mg CR tablet Take 5 mg by mouth daily. Indications: type 2 diabetes mellitus    hydroCHLOROthiazide (MICROZIDE) 12.5 mg capsule Take 12.5 mg by mouth daily. Indications: high blood pressure    solifenacin (VESICARE) 5 mg tablet Take 5 mg by mouth daily. Take / use AM day of surgery  per anesthesia protocols. Indications: urinary urgency, or the sudden urge to urinate           Additional DVT Prophylaxis:  JOHNY Hose,Plexi-Pulse    Postoperative Blood Report: If the patient received blood products during their admission they are listed below:     No results found for: PCTEXX  No results found for: PCTABR, ABORH  No results found for: ABORH, ABORHEXT  No results found for: PCTUN  No results found for: PCTCT  No results found for: PCTUDIV  No results found for: PCTXM    Post Op complications: none    Hemoglobin at discharge:   Lab Results   Component Value Date/Time    HGB 12.1 12/09/2020 05:05 AM       Wound appears to be healing without any evidence of infection. Physical Therapy started on the day of surgery and progressed. PT/OT:          Assistive Device: Fall prevention device, Walker (comment)                Discharged to: [facility].     Discharge instructions:  - Refer to the Medication Reconciliation sheet for all discharge medications   - Rx pain medication given  -Resume pre hospital diet            -Ambulate with walker, appropriate total joint protocol  -Follow up in office as scheduled       Signed:  Ok Toussaint MD  12/11/2020  9:55 AM

## 2022-03-19 PROBLEM — E11.9 DIABETES MELLITUS TYPE 2, CONTROLLED (HCC): Status: ACTIVE | Noted: 2020-12-07

## 2022-03-19 PROBLEM — M16.11 OSTEOARTHRITIS OF RIGHT HIP: Status: ACTIVE | Noted: 2020-12-07

## 2022-03-19 PROBLEM — I10 HYPERTENSION: Status: ACTIVE | Noted: 2020-12-07

## 2022-03-19 PROBLEM — E78.5 HYPERLIPIDEMIA: Status: ACTIVE | Noted: 2020-12-07

## 2022-03-19 PROBLEM — Z96.641 STATUS POST RIGHT HIP REPLACEMENT: Status: ACTIVE | Noted: 2020-12-07

## 2023-01-19 ENCOUNTER — OFFICE VISIT (OUTPATIENT)
Dept: ORTHOPEDIC SURGERY | Age: 88
End: 2023-01-19

## 2023-01-19 DIAGNOSIS — M54.9 BACK PAIN, UNSPECIFIED BACK LOCATION, UNSPECIFIED BACK PAIN LATERALITY, UNSPECIFIED CHRONICITY: ICD-10-CM

## 2023-01-19 DIAGNOSIS — Z96.641 PRESENCE OF RIGHT ARTIFICIAL HIP JOINT: Primary | ICD-10-CM
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
## 2023-02-20 ENCOUNTER — TELEPHONE (OUTPATIENT)
Dept: ORTHOPEDIC SURGERY | Age: 88
End: 2023-02-20

## 2023-02-20 DIAGNOSIS — M47.816 LUMBAR SPONDYLOSIS: Primary | ICD-10-CM

## 2023-02-20 DIAGNOSIS — M51.36 LUMBAR DEGENERATIVE DISC DISEASE: ICD-10-CM

## 2023-02-20 NOTE — TELEPHONE ENCOUNTER
Please call daughter at [phone]  He mom still in lot pain she wants a mri   Please call her she will tell you the location

## 2023-02-20 NOTE — TELEPHONE ENCOUNTER
Daughters number left was not correct so updated the patients chart with the correct number. Informed the patient I can upload the order for her mri per Dr. Benjamin Prom note. I will fax it to [facility] fax# [phone]. She will follow up with arlette to determine pathology after mri, scheduled for 3/2/23.

## 2023-02-22 ENCOUNTER — TELEPHONE (OUTPATIENT)
Dept: ORTHOPEDIC SURGERY | Age: 88
End: 2023-02-22

## 2023-02-22 DIAGNOSIS — M54.9 BACK PAIN, UNSPECIFIED BACK LOCATION, UNSPECIFIED BACK PAIN LATERALITY, UNSPECIFIED CHRONICITY: Primary | ICD-10-CM

## 2023-02-22 DIAGNOSIS — M47.816 LUMBAR SPONDYLOSIS: ICD-10-CM

## 2023-02-22 NOTE — TELEPHONE ENCOUNTER
Spoke with patient & her daughter about her MRI I scheduled it here since they do not want to do it later time that was available at [facility]. Her follow up is scheduled for the day after confirmed locations as well.

## 2023-02-22 NOTE — TELEPHONE ENCOUNTER
Pt called and she isnt able to get her mri until 03/21. She wants the order sent to somewhere in [place] so she can have it done before her appt next week. Please call pt.

## 2023-03-02 ENCOUNTER — OFFICE VISIT (OUTPATIENT)
Dept: ORTHOPEDIC SURGERY | Age: 88
End: 2023-03-02

## 2023-03-02 DIAGNOSIS — M70.61 TROCHANTERIC BURSITIS OF RIGHT HIP: Primary | ICD-10-CM

## 2023-03-02 RX ORDER — METHYLPREDNISOLONE ACETATE 40 MG/ML
80 INJECTION, SUSPENSION INTRA-ARTICULAR; INTRALESIONAL; INTRAMUSCULAR; SOFT TISSUE ONCE
Status: COMPLETED | OUTPATIENT
Start: 2023-03-02 | End: 2023-03-02

## 2023-03-02 RX ADMIN — METHYLPREDNISOLONE ACETATE 80 MG: 40 INJECTION, SUSPENSION INTRA-ARTICULAR; INTRALESIONAL; INTRAMUSCULAR; SOFT TISSUE at 14:08
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
## 2024-11-06 ENCOUNTER — OFFICE VISIT (OUTPATIENT)
Dept: ORTHOPEDIC SURGERY | Age: 89
End: 2024-11-06
Payer: MEDICARE

## 2024-11-06 DIAGNOSIS — M25.562 LEFT KNEE PAIN, UNSPECIFIED CHRONICITY: Primary | ICD-10-CM

## 2024-11-06 DIAGNOSIS — M17.12 PRIMARY OSTEOARTHRITIS OF LEFT KNEE: ICD-10-CM

## 2024-11-06 PROCEDURE — 1160F RVW MEDS BY RX/DR IN RCRD: CPT | Performed by: PHYSICIAN ASSISTANT

## 2024-11-06 PROCEDURE — 1123F ACP DISCUSS/DSCN MKR DOCD: CPT | Performed by: PHYSICIAN ASSISTANT

## 2024-11-06 PROCEDURE — 99215 OFFICE O/P EST HI 40 MIN: CPT | Performed by: PHYSICIAN ASSISTANT

## 2024-11-06 PROCEDURE — 1036F TOBACCO NON-USER: CPT | Performed by: PHYSICIAN ASSISTANT

## 2024-11-06 PROCEDURE — G8484 FLU IMMUNIZE NO ADMIN: HCPCS | Performed by: PHYSICIAN ASSISTANT

## 2024-11-06 PROCEDURE — 1090F PRES/ABSN URINE INCON ASSESS: CPT | Performed by: PHYSICIAN ASSISTANT

## 2024-11-06 PROCEDURE — G8421 BMI NOT CALCULATED: HCPCS | Performed by: PHYSICIAN ASSISTANT

## 2024-11-06 PROCEDURE — G8427 DOCREV CUR MEDS BY ELIG CLIN: HCPCS | Performed by: PHYSICIAN ASSISTANT

## 2024-11-06 PROCEDURE — 1159F MED LIST DOCD IN RCRD: CPT | Performed by: PHYSICIAN ASSISTANT

## 2024-11-06 RX ORDER — PREDNISONE 10 MG/1
10 TABLET ORAL 2 TIMES DAILY
COMMUNITY
Start: 2024-09-23

## 2024-11-06 RX ORDER — FEBUXOSTAT 40 MG/1
40 TABLET, FILM COATED ORAL DAILY
COMMUNITY
Start: 2024-09-23

## 2024-11-06 RX ORDER — TORSEMIDE 20 MG/1
20 TABLET ORAL DAILY
COMMUNITY
Start: 2023-12-21

## 2024-11-06 RX ORDER — BENAZEPRIL HYDROCHLORIDE 5 MG/1
5 TABLET ORAL EVERY MORNING
COMMUNITY

## 2024-11-06 RX ORDER — CLOBETASOL PROPIONATE 0.5 MG/ML
SOLUTION TOPICAL
COMMUNITY
Start: 2024-10-08

## 2024-11-06 NOTE — PROGRESS NOTES
Name: Fermin Delatorre  YOB: 1930  Gender: female  MRN: [id number]    CC:   Chief Complaint   Patient presents with    Knee Pain     Left knee         HPI: Fermin Delatorre is a 89 y.o. female with a  has a past medical history of Arthralgia, Chronic kidney disease, CKD (chronic kidney disease), stage III (HCC), Essential hypertension, Hip pain, Hyperlipidemia, Mild aortic valve regurgitation, Murmur, Neuropathy, Overactive bladder, Primary osteoarthritis of both knees, Right bundle branch block, and Type 2 diabetes mellitus (HCC).  And s/p right TANIA in 2020 by Dr. Hassan  here for evaluation of left knee pain as a second opinion.  She has been dealing with a pretty severe left lower extremity radiculopathy that lasted several weeks [facility].   She has had 2 rounds of Medrol Dosepak's, and finally had some symptom relief in July/August, but complete resolve after gelsyn 3 injections in September.  She has not had a recurrence of her symptoms since then, but states the lateral knee/leg pain became so severe she started walking with a walker at home.   She kept this appointment at the recommendation of [place], but states she was not very sure what her goal of this appointment was other than reassurance.      Allergies   Allergen Reactions    Fesoterodine Other (See Comments)     Caused stomach issues    Other reaction(s): Other (comments)   Caused stomach issues   Caused stomach issues   Caused stomach issues         Review of Systems:  As per HPI.  Pertinent positives and negatives are addressed with the patient, particularly those related to musculoskeletal concerns.   Non-orthopaedic concerns were referred back to the primary care physician.    PHYSICAL EXAMINATION:   The patient appears their stated age and they are in no distress.  There were no vitals taken for this visit.      The lower extremities are as described below.  Circulation is normal with

## 2024-11-15 ENCOUNTER — TELEPHONE (OUTPATIENT)
Dept: ORTHOPEDIC SURGERY | Age: 89
End: 2024-11-15

## 2024-11-15 DIAGNOSIS — M25.562 LEFT KNEE PAIN, UNSPECIFIED CHRONICITY: Primary | ICD-10-CM

## 2024-11-15 NOTE — TELEPHONE ENCOUNTER
Spoke with the daughter confirmed knee mri will be arranged. Will have appts contact yo get this scheduled & update the order in pt chart

## 2024-11-15 NOTE — TELEPHONE ENCOUNTER
Patient  has  decided to  have the  MRI that  was mentioned  at the  11/6  visit    Please  order and  call  dtrajesh Ariza  to  set this up

## 2024-12-02 ENCOUNTER — TELEPHONE (OUTPATIENT)
Dept: ORTHOPEDIC SURGERY | Age: 88
End: 2024-12-02

## 2024-12-02 NOTE — TELEPHONE ENCOUNTER
Would like ri results otp due to living so far & her being elderly - will show mri to sydnie & see what we need to do as far as f/u

## 2024-12-02 NOTE — TELEPHONE ENCOUNTER
Please call her daughter Annita, she was hoping they could do a phone call, she is 89 , daughter lives in [facility],has to go pu [facility]
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
## 2025-01-09 ENCOUNTER — CLINICAL DOCUMENTATION (OUTPATIENT)
Dept: ORTHOPEDIC SURGERY | Age: 89
End: 2025-01-09

## 2025-01-09 NOTE — PROGRESS NOTES
Daughter called back, mailing MRI report and disc to Dr Ardon @ [facility] [address] [facility] [phone].

## 2025-01-17 ENCOUNTER — CLINICAL DOCUMENTATION (OUTPATIENT)
Dept: ORTHOPEDIC SURGERY | Age: 89
End: 2025-01-17

## 2025-01-17 NOTE — PROGRESS NOTES
Daughter says On license of [facility] has not received what was mailed, requested we fax mri to them @ [phone].

## (undated) DEVICE — HANDPIECE SET WITH COAXIAL HIGH FLOW TIP AND SUCTION TUBE: Brand: INTERPULSE

## (undated) DEVICE — DUAL CUT SAGITTAL BLADE

## (undated) DEVICE — SUTURE VCRL SZ 2-0 L27IN ABSRB UD L36MM CP-1 1/2 CIR REV J266H

## (undated) DEVICE — REM POLYHESIVE ADULT PATIENT RETURN ELECTRODE: Brand: VALLEYLAB

## (undated) DEVICE — STOCKINETTE,IMPERVIOUS,12X48,STERILE: Brand: MEDLINE

## (undated) DEVICE — 3M™ IOBAN™ 2 ANTIMICROBIAL INCISE DRAPE 6651EZ: Brand: IOBAN™ 2

## (undated) DEVICE — DRAPE TWL SURG 16X26IN BLU ORB04] ALLCARE INC]

## (undated) DEVICE — FEMORAL CANAL BRUSH, IRRIGATION/SUCTION

## (undated) DEVICE — SOLUTION IV 250ML 0.9% SOD CHL CLR INJ FLX BG CONT PRT CLSR

## (undated) DEVICE — STAPLER SKIN H3.9MM WIRE DIA0.58MM CRWN 6.9MM 35 STPL FIX

## (undated) DEVICE — Z DISCONTINUED PER MEDLINE USE 2741944 DRESSING AQUACEL 12 IN SURG W9XL30CM SIL CVR WTRPRF VIR BACT BARR ANTIMIC

## (undated) DEVICE — BIPOLAR SEALER 23-112-1 AQM 6.0: Brand: AQUAMANTYS ®

## (undated) DEVICE — SOLUTION IRRIG 3000ML 0.9% SOD CHL FLX CONT 0797208] ICU MEDICAL INC]

## (undated) DEVICE — (D)PREP SKN CHLRAPRP APPL 26ML -- CONVERT TO ITEM 371833

## (undated) DEVICE — Device: Brand: POWER-FLO®

## (undated) DEVICE — SUTURE ABSRB X-1 REV CUT 1/2 CIR 22MM UD BRAID 27IN SZ 3-0 J458H

## (undated) DEVICE — SOLUTION IV 1000ML 0.9% SOD CHL

## (undated) DEVICE — TOTAL HIP DR JENNINGS: Brand: MEDLINE INDUSTRIES, INC.

## (undated) DEVICE — TRAY PREP DRY W/ PREM GLV 2 APPL 6 SPNG 2 UNDPD 1 OVERWRAP

## (undated) DEVICE — SYR 50ML LR LCK 1ML GRAD NSAF --

## (undated) DEVICE — HOOD: Brand: FLYTE

## (undated) DEVICE — Z DISCONTINUED USE 2744636  DRESSING AQUACEL 14 IN ALG W3.5XL14IN POLYUR FLM CVR W/ HYDRCOLL

## (undated) DEVICE — STOCKINETTE TUBE 6X48 -- MEDICHOICE

## (undated) DEVICE — SYR 10ML LUER LOK 1/5ML GRAD --

## (undated) DEVICE — SYR LR LCK 1ML GRAD NSAF 30ML --

## (undated) DEVICE — STAPLER SKIN SQ 30 ABSRB STPL DISP INSORB

## (undated) DEVICE — 3M™ STERI-DRAPE™ INSTRUMENT POUCH 1018: Brand: STERI-DRAPE™

## (undated) DEVICE — 3M™ STERI-DRAPE™ INCISE DRAPE, XL 1051: Brand: STERI-DRAPE™

## (undated) DEVICE — T4 HOOD

## (undated) DEVICE — TIP SUCTION ORAL YANKAUER TIP --